# Patient Record
Sex: MALE | Race: WHITE
[De-identification: names, ages, dates, MRNs, and addresses within clinical notes are randomized per-mention and may not be internally consistent; named-entity substitution may affect disease eponyms.]

---

## 2017-04-02 ENCOUNTER — HOSPITAL ENCOUNTER (EMERGENCY)
Dept: HOSPITAL 52 - LL.ED | Age: 72
Discharge: ANOTHER HOSPITAL | End: 2017-04-02
Payer: MEDICARE

## 2017-04-02 VITALS — SYSTOLIC BLOOD PRESSURE: 119 MMHG | DIASTOLIC BLOOD PRESSURE: 57 MMHG

## 2017-04-02 DIAGNOSIS — I48.91: ICD-10-CM

## 2017-04-02 DIAGNOSIS — Z87.891: ICD-10-CM

## 2017-04-02 DIAGNOSIS — E78.00: ICD-10-CM

## 2017-04-02 DIAGNOSIS — E66.9: ICD-10-CM

## 2017-04-02 DIAGNOSIS — I25.810: ICD-10-CM

## 2017-04-02 DIAGNOSIS — L03.116: Primary | ICD-10-CM

## 2017-04-02 DIAGNOSIS — Z79.4: ICD-10-CM

## 2017-04-02 DIAGNOSIS — E11.9: ICD-10-CM

## 2017-04-02 DIAGNOSIS — M19.90: ICD-10-CM

## 2017-04-02 DIAGNOSIS — I50.9: ICD-10-CM

## 2017-04-02 DIAGNOSIS — Z79.899: ICD-10-CM

## 2017-04-02 DIAGNOSIS — F32.9: ICD-10-CM

## 2017-04-02 DIAGNOSIS — J44.9: ICD-10-CM

## 2017-04-02 DIAGNOSIS — Z79.01: ICD-10-CM

## 2017-04-02 DIAGNOSIS — F41.9: ICD-10-CM

## 2017-04-02 LAB
CHLORIDE SERPL-SCNC: 104 MMOL/L (ref 98–107)
SODIUM SERPL-SCNC: 142 MMOL/L (ref 136–145)

## 2017-04-02 PROCEDURE — 36415 COLL VENOUS BLD VENIPUNCTURE: CPT

## 2017-04-02 PROCEDURE — 99285 EMERGENCY DEPT VISIT HI MDM: CPT

## 2017-04-02 PROCEDURE — 83605 ASSAY OF LACTIC ACID: CPT

## 2017-04-02 PROCEDURE — 96367 TX/PROPH/DG ADDL SEQ IV INF: CPT

## 2017-04-02 PROCEDURE — 85025 COMPLETE CBC W/AUTO DIFF WBC: CPT

## 2017-04-02 PROCEDURE — 87040 BLOOD CULTURE FOR BACTERIA: CPT

## 2017-04-02 PROCEDURE — 81001 URINALYSIS AUTO W/SCOPE: CPT

## 2017-04-02 PROCEDURE — 96365 THER/PROPH/DIAG IV INF INIT: CPT

## 2017-04-02 PROCEDURE — 96360 HYDRATION IV INFUSION INIT: CPT

## 2017-04-02 PROCEDURE — 80053 COMPREHEN METABOLIC PANEL: CPT

## 2017-04-02 PROCEDURE — 85610 PROTHROMBIN TIME: CPT

## 2017-04-02 NOTE — EDM.PDOC
ED HPI GENERAL MEDICAL PROBLEM





- General


Chief Complaint: General


Stated Complaint: FEVER, LEFT FOOT WOUND


Time Seen by Provider: 04/02/17 00:57


Source of Information: Reports: Patient, EMS, Other ([facility] staff)





- History of Present Illness


INITIAL COMMENTS - FREE TEXT/NARRATIVE: 





[facility] from [facility] for evaluation of fever and red/swollen 

left foot.  Per [facility], patient had developed large blister on bottom of left 

foot and [facility].  At that time the blister was 

opened and patient received IV antibiotics per report. Was not sent home on 

antibiotics. Staff noticed increasing redness and foot appeared more swollen 

this evening. Patient eventually developed fever of 102 degrees. 





Patient himself is without complaint. He denies any pain nor does he feel sick.

  


Treatments PTA: Reports: Acetaminophen





- Related Data


 Allergies











Allergy/AdvReac Type Severity Reaction Status Date / Time


 


No Known Allergies Allergy   Verified 04/02/17 00:40











Home Meds: 


 Home Meds





Acetaminophen 2 tab PO Q6H PRN 09/30/15 [History]


Acetaminophen/HYDROcodone [Norco 325-5 MG] 1 tab PO Q6H PRN 09/30/15 [History]


Bisacodyl [Dulcolax] 10 mg RECTAL DAILY PRN 09/30/15 [History]


Carboxymethylcellulose/Lytes [Walt-Stir Oral Cowdrey] 1 applic MUCMEM ASDIRECTED 09

/30/15 [History]


Cholecalciferol (Vitamin D3) [Vitamin D] 800 unit PO DAILY 09/30/15 [History]


Escitalopram [Lexapro] 5 mg PO DAILY 09/30/15 [History]


Gabapentin [Neurontin] 900 mg PO TID 09/30/15 [History]


Hydrocortisone [Anusol-HC] 30 gm RC BID PRN 09/30/15 [History]


Insulin Detemir [Levemir] 18 unit SQ DAILY 09/30/15 [History]


Ipratropium [Atrovent 0.06% Nasal Spray] 15 ml JOVITA BID 09/30/15 [History]


LORazepam 0.25 mg PO BID PRN 09/30/15 [History]


Magnesium Hydroxide [Milk of Magnesia] 30 ml PO BID 09/30/15 [History]


Methadone 10 mg PO BID 09/30/15 [History]


Methyl Salicylate/Menthol [Muscle Rub] 1 applic TP Q6H PRN 09/30/15 [History]


Potassium Chloride 40 meq PO TID 09/30/15 [History]


QUEtiapine Fumarate [Quetiapine Fumarate] 25 mg PO TID 09/30/15 [History]


Sennosides/Docusate Sodium [Dok Plus Tablet] 1 each PO BID 09/30/15 [History]


Simvastatin [Zocor] 10 mg PO DAILY 09/30/15 [History]


Sotalol [Betapace] 80 mg PO BID 09/30/15 [History]


Torsemide 20 mg PO BID 09/30/15 [History]


Triamcinolone Acetonide [Triamcinolone Acetonide 0.1% Crm] 15 gm TOP BID PRN 09/

30/15 [History]


Warfarin [Coumadin] 5 mg PO ASDIRECTED 09/30/15 [History]


Albuterol/Ipratropium [DuoNeb 3.0-0.5 MG/3 ML] 1 vial INH BID 10/01/15 [History]


LORazepam [Ativan] 0.5 mg PO TID 10/01/15 [History]


Sodium Chloride [Saline Nasal Spray] 1 spray JOVITA ASDIRECTED PRN 10/01/15 [

History]


Theophylline [Theophylline Anhydrous] 300 mg PO BID 10/01/15 [History]


Enoxaparin [Lovenox] 150 mg SQ BID 04/02/17 [History]











Past Medical History


HEENT History: Reports: Cataract, Glaucoma, Hard of hearing


Cardiovascular History: Reports: Afib, Blood clots/VTE/DVT (PE), CAD, Heart 

Failure, High cholesterol, PVD


Respiratory History: Reports: COPD, Sleep apnea


Other Respiratory History: Diagnosis of a PE


Gastrointestinal History: Reports: Chronic constipation


Genitourinary History: Reports: BPH, Chronic renal insuffiency, Urinary 

incontinence


Other Genitourinary History: roth in place


Musculoskeletal History: Reports: Back pain, chronic, Osteoarthritis


Other Musculoskeletal History: spinal stenosis, generalized muscle weakness, 

chronic pain syndrome


Psychiatric History: Reports: Anxiety, Depression


Endocrine/Metabolic History: Reports: Diabetes, type II, Obesity/BMI 30+


Dermatologic History: Reports: Cellulitis





- Past Surgical History


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





Social & Family History





- Tobacco Use


Smoking Status *Q: Former Smoker


Years of Tobacco use: 20


Packs/Tins Daily: 2


Used Tobacco, but Quit: Yes


Month Tobacco Last Used: unsure


Second Hand Smoke Exposure: No





- Caffeine Use


Caffeine Use: Reports: None





- Recreational Drug Use


Recreational Drug Use: No





ED ROS GENERAL





- Review of Systems


Review Of Systems: ROS reveals no pertinent complaints other than HPI. (Patient 

has no complaints. Knows he is at hospital. Says he feels fine.)





ED EXAM, GENERAL





- Physical Exam


Exam: See Below


Exam Limited By: No limitations


General Appearance: other (sleeping but wakes up to answer questions. Tries to 

cooperate with exam. )


Eye Exam: bilateral eye: EOMI


Ears: normal external exam


Nose: No: no blood, nasal swelling, nasal drainage


Throat/Mouth: Normal lips, Normal voice, No airway compromise


Head: atraumatic, normocephalic


Neck: supple, non-tender, full range of motion


Respiratory/Chest: no respiratory distress, lungs clear, normal breath sounds, 

no accessory muscle use, chest non-tender


Cardiovascular: regular rate, rhythm, no murmur


Peripheral Pulses: 1+: dorsalis pedis (L), dorsalis pedis (R)


GI/Abdominal: soft, non tender


 (Male) Exam: Deferred


Rectal (Males) Exam: Deferred


Extremities: pedal edema, other (dusky color to legs below knees. Edema of 

ankles and feet bilaterally. Left foot reddened. Large blister noted involving 

much of sole of left foot. Opened on one edge. No active drainage. ).  No: Lesley

's Sign, leg pain


Neurological: alert


Psychiatric: other (appropriate. Prefers to be left alone to sleep. )


Skin Exam: Warm, Dry, Other (see above for blister/redness left foot)





Course





- Vital Signs


Last Recorded V/S: 





 Last Vital Signs











Temp  38.6 C H  04/02/17 00:30


 


Pulse  72   04/02/17 01:30


 


Resp  22 H  04/02/17 01:00


 


BP  119/57 L  04/02/17 01:30


 


Pulse Ox  96   04/02/17 01:30














- Orders/Labs/Meds


Orders: 





 Active Orders 24 hr











 Category Date Time Status


 


 CULTURE BLOOD [BC] Stat Lab  04/02/17 00:59 Ordered


 


 CULTURE BLOOD [BC] Stat Lab  04/02/17 00:59 Ordered


 


 UA W/MICROSCOPIC [URIN] Stat Lab  04/02/17 00:59 Uncollected


 


 Piperacillin/Tazobactam [Zosyn] 3.375 gm Med  04/02/17 01:30 Ordered





 Sodium Chloride 0.9% [Normal Saline] 100 ml   





 IV Q6H   


 


 Vancomycin 2,000 mg Med  04/02/17 01:11 Ordered





 Sodium Chloride 0.9% [Normal Saline] 100 ml   





 IV ONETIME   


 


 Blood Culture x2 Reflex Set [OM.PC] Stat Oth  04/02/17 00:59 Ordered








 Medication Orders





Vancomycin HCl 2,000 mg/ (Sodium Chloride)  100 mls @ 110 mls/hr IV ONETIME ONE


   Stop: 04/02/17 02:05


Piperacillin Sod/Tazobactam (Sod 3.375 gm/ Sodium Chloride)  100 mls @ 200 mls/

hr IV Q6H ONE


   Stop: 04/02/17 01:59








Labs: 





 Laboratory Tests











  04/02/17 04/02/17 04/02/17 Range/Units





  01:00 01:00 01:00 


 


WBC  12.6 H    (4.0-10.2)  K/uL


 


RBC  3.79 L    (4.33-5.41)  M/uL


 


Hgb  10.3 L    (13.1-16.8)  g/dL


 


Hct  36.2 L    (39.0-49.0)  %


 


MCV  95.5    (84.0-98.0)  fL


 


MCH  27.2 L    (28.2-33.3)  pg


 


MCHC  28.5 L    (31.7-36.0)  g/dL


 


RDW  14.4 H    (11.2-14.1)  %


 


Plt Count  286    (150-350)  K/uL


 


Neut % (Auto)  80.3 H    (45.0-80.0)  %


 


Lymph % (Auto)  11.7    (10.0-50.0)  %


 


Mono % (Auto)  6.4    (2.0-14.0)  %


 


Eos % (Auto)  1.5    (0.0-5.0)  %


 


Baso % (Auto)  0.1    (0.0-2.0)  %


 


Neut # (Auto)  10.11 H    (1.40-7.00)  K/uL


 


Lymph # (Auto)  1.48    (0.50-3.50)  K/uL


 


Mono # (Auto)  0.81    (0.00-1.00)  K/uL


 


Eos # (Auto)  0.19    (0.00-0.50)  K/uL


 


Baso # (Auto)  0.01    (0.00-0.20)  K/uL


 


PT   17.4 H   (9.8-11.7)  SEC


 


INR   1.6   


 


Sodium    142  (136-145)  mmol/L


 


Potassium    5.2 H  (3.5-5.1)  mmol/L


 


Chloride    104  ()  mmol/L


 


Carbon Dioxide    32.2 H  (21.0-32.0)  mmol/L


 


BUN    27 H  (7-18)  mg/dL


 


Creatinine    0.95  (0.51-1.17)  mg/dL


 


Est Cr Clr Drug Dosing    88.58  mL/min


 


Estimated GFR (MDRD)    > 60  mL/min


 


Glucose    207 H  ()  mg/dL


 


Lactic Acid     (0.4-2.0)  mmol/L


 


Calcium    9.0  (8.5-10.1)  mg/dL


 


Total Bilirubin    0.2  (0.2-1.0)  mg/dL


 


AST    20  (15-37)  U/L


 


ALT    16  (12-78)  U/L


 


Alkaline Phosphatase    108  ()  IU/L


 


Total Protein    7.3  (6.4-8.2)  g/dL


 


Albumin    2.5 L  (3.4-5.0)  g/dL














  04/02/17 Range/Units





  01:00 


 


WBC   (4.0-10.2)  K/uL


 


RBC   (4.33-5.41)  M/uL


 


Hgb   (13.1-16.8)  g/dL


 


Hct   (39.0-49.0)  %


 


MCV   (84.0-98.0)  fL


 


MCH   (28.2-33.3)  pg


 


MCHC   (31.7-36.0)  g/dL


 


RDW   (11.2-14.1)  %


 


Plt Count   (150-350)  K/uL


 


Neut % (Auto)   (45.0-80.0)  %


 


Lymph % (Auto)   (10.0-50.0)  %


 


Mono % (Auto)   (2.0-14.0)  %


 


Eos % (Auto)   (0.0-5.0)  %


 


Baso % (Auto)   (0.0-2.0)  %


 


Neut # (Auto)   (1.40-7.00)  K/uL


 


Lymph # (Auto)   (0.50-3.50)  K/uL


 


Mono # (Auto)   (0.00-1.00)  K/uL


 


Eos # (Auto)   (0.00-0.50)  K/uL


 


Baso # (Auto)   (0.00-0.20)  K/uL


 


PT   (9.8-11.7)  SEC


 


INR   


 


Sodium   (136-145)  mmol/L


 


Potassium   (3.5-5.1)  mmol/L


 


Chloride   ()  mmol/L


 


Carbon Dioxide   (21.0-32.0)  mmol/L


 


BUN   (7-18)  mg/dL


 


Creatinine   (0.51-1.17)  mg/dL


 


Est Cr Clr Drug Dosing   mL/min


 


Estimated GFR (MDRD)   mL/min


 


Glucose   ()  mg/dL


 


Lactic Acid  1.8  (0.4-2.0)  mmol/L


 


Calcium   (8.5-10.1)  mg/dL


 


Total Bilirubin   (0.2-1.0)  mg/dL


 


AST   (15-37)  U/L


 


ALT   (12-78)  U/L


 


Alkaline Phosphatase   ()  IU/L


 


Total Protein   (6.4-8.2)  g/dL


 


Albumin   (3.4-5.0)  g/dL











Meds: 





Medications











Generic Name Dose Route Start Last Admin





  Trade Name Freq  PRN Reason Stop Dose Admin


 


Vancomycin HCl 2,000 mg/  100 mls @ 110 mls/hr  04/02/17 01:11  





  Sodium Chloride  IV  04/02/17 02:05  





  ONETIME ONE   


 


Piperacillin Sod/Tazobactam  100 mls @ 200 mls/hr  04/02/17 01:30  





  Sod 3.375 gm/ Sodium Chloride  IV  04/02/17 01:59  





  Q6H ONE   














- Re-Assessments/Exams


Free Text/Narrative Re-Assessment/Exam: 








Assessment: Cellulitis of left foot.





WBC 12.6


Hgb 10.3


INR 1.6


K 5.2


Cr 0.95


Glu 207





[facility]  [facility]. She requested 

that we give Zosyn as well as Vanco prior to transfer. 


Small amount of NS also given. 


Patient rested comfortably. Vital signs stable. 


Potassium just above normal limits. No further intervention other than IV fluid 

prior to transfer. 





Departure





- Departure


Time of Disposition: 02:15


Disposition: DC/[facility] to [facility]


Condition: good


Clinical Impression: 


 Cellulitis of foot, left





Forms:  ED Department Discharge





- My Orders


Last 24 Hours: 





My Active Orders





04/02/17 00:59


CULTURE BLOOD [BC] Stat 


CULTURE BLOOD [BC] Stat 


UA W/MICROSCOPIC [URIN] Stat 


Blood Culture x2 Reflex Set [OM.PC] Stat 





04/02/17 01:11


Vancomycin 2,000 mg   Sodium Chloride 0.9% [Normal Saline] 100 ml IV ONETIME 





04/02/17 01:30


Piperacillin/Tazobactam [Zosyn] 3.375 gm   Sodium Chloride 0.9% [Normal Saline] 

100 ml IV Q6H 














- Assessment/Plan


Last 24 Hours: 





My Active Orders





04/02/17 00:59


CULTURE BLOOD [BC] Stat 


CULTURE BLOOD [BC] Stat 


UA W/MICROSCOPIC [URIN] Stat 


Blood Culture x2 Reflex Set [OM.PC] Stat 





04/02/17 01:11


Vancomycin 2,000 mg   Sodium Chloride 0.9% [Normal Saline] 100 ml IV ONETIME 





04/02/17 01:30


Piperacillin/Tazobactam [Zosyn] 3.375 gm   Sodium Chloride 0.9% [Normal Saline] 

100 ml IV Q6H

## 2017-05-08 ENCOUNTER — HOSPITAL ENCOUNTER (INPATIENT)
Dept: HOSPITAL 52 - LL.ED | Age: 72
LOS: 4 days | Discharge: NURSING, REHAB OR LONG-TERM CARE | DRG: 190 | End: 2017-05-12
Attending: FAMILY MEDICINE | Admitting: FAMILY MEDICINE
Payer: MEDICARE

## 2017-05-08 DIAGNOSIS — I50.9: ICD-10-CM

## 2017-05-08 DIAGNOSIS — I25.10: ICD-10-CM

## 2017-05-08 DIAGNOSIS — E11.9: ICD-10-CM

## 2017-05-08 DIAGNOSIS — J18.9: ICD-10-CM

## 2017-05-08 DIAGNOSIS — J44.0: Primary | ICD-10-CM

## 2017-05-08 DIAGNOSIS — M15.0: ICD-10-CM

## 2017-05-08 DIAGNOSIS — D64.9: ICD-10-CM

## 2017-05-08 DIAGNOSIS — Z79.4: ICD-10-CM

## 2017-05-08 DIAGNOSIS — E11.621: ICD-10-CM

## 2017-05-08 DIAGNOSIS — L97.409: ICD-10-CM

## 2017-05-08 DIAGNOSIS — R13.10: ICD-10-CM

## 2017-05-08 DIAGNOSIS — I26.99: ICD-10-CM

## 2017-05-08 DIAGNOSIS — N28.9: ICD-10-CM

## 2017-05-08 DIAGNOSIS — Z95.5: ICD-10-CM

## 2017-05-08 DIAGNOSIS — Z87.891: ICD-10-CM

## 2017-05-08 DIAGNOSIS — Z79.01: ICD-10-CM

## 2017-05-08 DIAGNOSIS — R79.89: ICD-10-CM

## 2017-05-08 DIAGNOSIS — I11.0: ICD-10-CM

## 2017-05-08 DIAGNOSIS — L03.116: ICD-10-CM

## 2017-05-08 DIAGNOSIS — I48.91: ICD-10-CM

## 2017-05-08 DIAGNOSIS — E83.42: ICD-10-CM

## 2017-05-08 DIAGNOSIS — E88.09: ICD-10-CM

## 2017-05-08 LAB
CHLORIDE SERPL-SCNC: 102 MMOL/L (ref 98–107)
SODIUM SERPL-SCNC: 140 MMOL/L (ref 136–145)

## 2017-05-08 PROCEDURE — 82550 ASSAY OF CK (CPK): CPT

## 2017-05-08 PROCEDURE — 82553 CREATINE MB FRACTION: CPT

## 2017-05-08 PROCEDURE — 85610 PROTHROMBIN TIME: CPT

## 2017-05-08 PROCEDURE — 84550 ASSAY OF BLOOD/URIC ACID: CPT

## 2017-05-08 PROCEDURE — 84484 ASSAY OF TROPONIN QUANT: CPT

## 2017-05-08 PROCEDURE — 86318 IA INFECTIOUS AGENT ANTIBODY: CPT

## 2017-05-08 PROCEDURE — 36415 COLL VENOUS BLD VENIPUNCTURE: CPT

## 2017-05-08 PROCEDURE — 83735 ASSAY OF MAGNESIUM: CPT

## 2017-05-08 PROCEDURE — 85025 COMPLETE CBC W/AUTO DIFF WBC: CPT

## 2017-05-08 PROCEDURE — 93005 ELECTROCARDIOGRAM TRACING: CPT

## 2017-05-08 PROCEDURE — 96375 TX/PRO/DX INJ NEW DRUG ADDON: CPT

## 2017-05-08 PROCEDURE — 96365 THER/PROPH/DIAG IV INF INIT: CPT

## 2017-05-08 PROCEDURE — 83605 ASSAY OF LACTIC ACID: CPT

## 2017-05-08 PROCEDURE — 87804 INFLUENZA ASSAY W/OPTIC: CPT

## 2017-05-08 PROCEDURE — 99285 EMERGENCY DEPT VISIT HI MDM: CPT

## 2017-05-08 PROCEDURE — 85379 FIBRIN DEGRADATION QUANT: CPT

## 2017-05-08 PROCEDURE — 71275 CT ANGIOGRAPHY CHEST: CPT

## 2017-05-08 PROCEDURE — 85730 THROMBOPLASTIN TIME PARTIAL: CPT

## 2017-05-08 PROCEDURE — 80053 COMPREHEN METABOLIC PANEL: CPT

## 2017-05-08 PROCEDURE — 83880 ASSAY OF NATRIURETIC PEPTIDE: CPT

## 2017-05-08 PROCEDURE — 84443 ASSAY THYROID STIM HORMONE: CPT

## 2017-05-08 PROCEDURE — 71010: CPT

## 2017-05-08 PROCEDURE — 87040 BLOOD CULTURE FOR BACTERIA: CPT

## 2017-05-08 PROCEDURE — S0028 INJECTION, FAMOTIDINE, 20 MG: HCPCS

## 2017-05-08 RX ADMIN — BUDESONIDE SCH MG: 0.5 SUSPENSION RESPIRATORY (INHALATION) at 19:50

## 2017-05-08 RX ADMIN — Medication PRN ML: at 15:58

## 2017-05-08 RX ADMIN — HYDROCODONE BITATRATE AND ACETAMINOPHEN SCH TAB: 5; 325 TABLET ORAL at 19:50

## 2017-05-08 RX ADMIN — Medication PRN ML: at 22:12

## 2017-05-08 RX ADMIN — THEOPHYLLINE SCH MG: 300 TABLET, EXTENDED RELEASE ORAL at 19:51

## 2017-05-08 RX ADMIN — Medication PRN ML: at 15:55

## 2017-05-08 RX ADMIN — POTASSIUM CHLORIDE SCH MEQ: 1500 TABLET, EXTENDED RELEASE ORAL at 18:23

## 2017-05-08 RX ADMIN — Medication PRN ML: at 10:35

## 2017-05-08 RX ADMIN — GUAIFENESIN AND DEXTROMETHORPHAN HYDROBROMIDE SCH TAB: 600; 30 TABLET, EXTENDED RELEASE ORAL at 18:23

## 2017-05-08 NOTE — EDM.PDOC
ED HPI GENERAL MEDICAL PROBLEM





- General


Chief Complaint: Respiratory Problem


Stated Complaint: hypoxia, lethargy


Time Seen by Provider: 05/08/17 09:50


Source of Information: Reports: Patient, Nursing home records, Old records ([facility] EMR.  No paper hospital chart available.)


History Limitations: Reports: Altered mental status (Some confusion with 

patient a somewhat poor historian)





- History of Present Illness


INITIAL COMMENTS - FREE TEXT/NARRATIVE: 





The patient was brought to the emergency room via transport vehicle from [facility] for evaluation of progressive confusion and 

hypoxia with symptoms starting at about 04:45 hours this morning.  Patient had 

a temperature of 101 in their facility and was started on O2 therapy and 

transferred to this facility with 6 L per minute by mask.  He apparently had an 

O2 sat as low as 75% on room air earlier this morning.  He was recently 

discharged from the [facility] in [place] for pneumonia.  Apparent mostly clear 

productive cough by his history.  Patient did receive his morning medications, 

including insulin, etc.


Onset: gradual


Onset Date: 05/08/17


Onset Time: 04:45


Duration: Getting worse


Location: Reports: other (No pain)


Improves with: Reports: None


Worsens with: Reports: None


Context: Reports: Other (As above)


Associated Symptoms: Reports: confusion, cough, cough w sputum, fever/chills, 

shortness of breath, weakness (Stable generalized).  Denies: chest pain, 

diaphoresis, malaise, nausea/vomiting, seizure


Treatments PTA: Reports: Oxygen





- Related Data


 Allergies











Allergy/AdvReac Type Severity Reaction Status Date / Time


 


bee venom protein (honey bee) Allergy  Cannot Verified 05/08/17 09:55





   Remember  











Home Meds: 


 Home Meds





Acetaminophen 2 tab PO Q6H PRN 09/30/15 [History]


Acetaminophen/HYDROcodone [Norco 325-5 MG] 2 tab PO 08,19 PRN 09/30/15 [History]


Bisacodyl [Dulcolax] 10 mg RECTAL DAILY PRN 09/30/15 [History]


Carboxymethylcellulose/Lytes [Walt-Stir Oral [place]] 1 applic MUCMEM BID PRN 09/30

/15 [History]


Cholecalciferol (Vitamin D3) [Vitamin D] 800 unit PO DAILY@10 09/30/15 [History]


Escitalopram [Lexapro] 10 mg PO DAILY@08 09/30/15 [History]


Gabapentin [Neurontin] 900 mg PO 10,15,19 09/30/15 [History]


Hydrocortisone [Anusol-HC] 30 gm RC BID PRN 09/30/15 [History]


Insulin Detemir [Levemir] 18 unit SQ DAILY@10 09/30/15 [History]


Ipratropium [Atrovent 0.06% Nasal Spray] 1 inh JOVITA DAILY@10 09/30/15 [History]


LORazepam 0.5 mg PO 10,15,19 09/30/15 [History]


Magnesium Hydroxide [Milk of Magnesia] 30 ml PO DAILY PRN 09/30/15 [History]


Methadone 10 mg PO 10,19 09/30/15 [History]


Methyl Salicylate/Menthol [Muscle Rub] 1 applic TP BID PRN 09/30/15 [History]


Potassium Chloride 40 meq PO 10,15,19 09/30/15 [History]


QUEtiapine Fumarate [Quetiapine Fumarate] 25 mg PO 08,12,19 09/30/15 [History]


Sennosides/Docusate Sodium [Dok Plus Tablet] 1 each PO 10,19 09/30/15 [History]


Simvastatin [Zocor] 10 mg PO DAILY@19 09/30/15 [History]


Sotalol [Betapace] 40 mg PO 10,19 09/30/15 [History]


Torsemide 20 mg PO DAILY@10 09/30/15 [History]


Triamcinolone Acetonide [Triamcinolone Acetonide 0.1% Crm] 15 gm TOP BID PRN 09/

30/15 [History]


Warfarin [Coumadin] 5 mg PO SUTUTHSA@19 09/30/15 [History]


Albuterol/Ipratropium [DuoNeb 3.0-0.5 MG/3 ML] 1 vial INH 11,19 10/01/15 [

History]


LORazepam [Ativan] 0.25 mg PO BID PRN 10/01/15 [History]


Sodium Chloride [Saline Nasal Spray] 1 spray JOVITA ASDIRECTED PRN 10/01/15 [

History]


Theophylline [Theophylline Anhydrous] 300 mg PO 10,19 10/01/15 [History]


Fluticasone Propionate [Flovent  MCG] 2 puff INH BID 04/02/17 [History]


Latanoprost [Xalatan 0.005% Ophth Soln] 1 drop EYEBOTH DAILY@19 04/02/17 [

History]


Phenylephrine HCl [Sudogest PE] 10 mg PO Q4HR PRN 04/02/17 [History]


Polyethylene Glycol 3350 [Miralax] 17 gram PO DAILY@15 04/02/17 [History]


guaiFENesin/Dextromethorphan [Tussin Dm Syrup] 10 ml PO Q4HR PRN 04/02/17 [

History]


Acetaminophen/HYDROcodone [Norco 325-5 MG] 1 tab PO DAILY PRN 05/08/17 [History]


Albuterol/Ipratropium [DuoNeb 3.0-0.5 MG/3 ML] 3 ml NEB Q4HRRT PRN 05/08/17 [

History]


Warfarin [Coumadin] 2.5 mg PO MOWEFR@19 05/08/17 [History]











Past Medical History


HEENT History: Reports: Allergic rhinitis, Cataract, Glaucoma, Hard of hearing, 

Impaired vision, Macular degeneration, Other (see below)


Other HEENT History: Glasses, left-sided macular degeneration, bilateral 

presbycusis with no current therapy


Cardiovascular History: Reports: Afib, Arrhythmia, Blood clots/VTE/DVT, Bypass, 

CAD, Heart Failure, High cholesterol, Hypertension, PVD, Other (see below).  

Denies: Pacemaker


Other Cardiovascular History: Complete right bundle branch block/ bifascicular 

bundle-branch block, atrial fibrillation with current Coumadin therapy, 

peripheral vascular disease with recurrent diabetic foot ulcers, varicose veins


Respiratory History: Reports: Bronchitis, recurrent, COPD, Intubation, previous

, PE, Pneumonia, recurrent, Sleep apnea, Other (see below).  Denies: Asthma, 

Pneumothorax, TB


Other Respiratory History: Diagnosis of bilateral PE on 9/30/16 with current 

Coumadin therapy


Gastrointestinal History: Reports: Cholelithiasis, Chronic constipation, Fecal 

incontinence.  Denies: Bowel obstruction, Celiac disease, Gastritis, GERD, 

Hepatitis, Inflammatory bowel disease, Irritable bowel syndrome, Jaundice, 

Pancreatitis, PUD


Genitourinary History: Reports: BPH, Chronic renal insuffiency, Diabetic 

nephropathy, Urinary incontinence, UTI, recurrent, Other (see below)


Other Genitourinary History: Chronic condom catheter therapy


Musculoskeletal History: Reports: Arthritis, Back pain, chronic, Fracture, Neck 

pain, chronic, Osteoarthritis, Other (see below)


Other Musculoskeletal History: spinal stenosis in the lumbar region by CT scan, 

generalized muscle weakness and myalgias, chronic pain syndrome with chronic 

narcotic therapy, apparent previous left tibial fracture with surgery as below


Neurological History: Reports: Neuropathy, diabetic.  Denies: CVA, TIA


Psychiatric History: Reports: Addiction, Anxiety, Depression, Other (see below)


Other Psychiatric History: Chronic narcotic therapy


Endocrine/Metabolic History: Reports: Diabetes, type II, IDDM, Obesity/BMI 30+.

  Denies: Diabetes, type I, Hypothyroidism


Hematologic History: Reports: None.  Denies: Anemia


Immunologic History: Reports: None.  Denies: AIDS, HIV, SLE


Dermatologic History: Reports: Cellulitis, Decubitus ulcer, Venous stasis 

dermatitis, Other (see below)


Other Dermatologic History: Chronic decubiti and diabetic ulcers





- Past Surgical History


Head Surgeries/Procedures: Reports: None


HEENT Surgical History: Reports: Adenoidectomy, Cataract surgery, Oral surgery, 

Tonsillectomy, Other (see below).  Denies: Eye surgery, Laser surgery, LASIK, 

Naso-sinus surgery


Other HEENT Surgeries/Procedures: Multiple teeth extractions with complete 

upper dentures, bilateral cataract extractions


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


Respiratory Surgical History: Reports: Thoracotomy, Other (see below)


Other Respiratory Surgeries/Procedures: Medial sternotomy for CABG


GI Surgical History: Reports: Cholecystectomy, Other (see below).  Denies: 

Appendectomy


Other GI Surgeries/Procedures: Arthroscopic cholecystectomy


Male  Surgical History: Reports: Circumcision, Other (see below).  Denies: 

TURP-Transurethral resection of prostate, Vasectomy


Other Male  Surgeries/Procedures: Circumcision as an infant


Endocrine Surgical History: Reports: None


Neurological Surgical History: Reports: Lumbar spine, Spinal fusion


Musculoskeletal Surgical History: Reports: ORIF, Other (see below).  Denies: 

Joint replacement


Other Musculoskeletal Surgeries/Procedures:: Elías placement of left tibial 

fracture


Oncologic Surgical History: Reports: None


Dermatological Surgical History: Reports: None





- Past Imaging History


Past Imaging History: Reports: CAT scan (CT of the chest on 9/30/15 positive 

for bilateral PE, CT of the abdomen and pelvis with oral and IV contrast on 6/27 /14)





- History Comment


History Comment: Patient is a somewhat poor historian





Social & Family History





- Family History


Family Medical History: Unobtainable





- Tobacco Use


Smoking Status *Q: Former Smoker


Tobacco Use Within Last Twelve Months: No


Years of Tobacco use: 20


Packs/Tins Daily: 2


Used Tobacco, but Quit: Yes


Month Tobacco Last Used: unsure


Smoking Cessation Information Provided To Patient: No


Second Hand Smoke Exposure: No


Second Hand Smoke Education Provided: No





- Caffeine Use


Caffeine Use: Reports: None





- Alcohol Use


Alcohol Use History: No


Days Per Week of Alcohol Use: 0 (Previous moderate alcohol with patient denying 

previous abuse)


Alcohol Use in Last Twelve Months: No





- Recreational Drug Use


Recreational Drug Use: No


Drug Use in Last 12 Months: No


Recreational Drug Type: Reports: Other (see below)


Other Recreational Drug Type: chronic narcotic use


Recreational Drug Route: Reports: Oral





- Living Situation & Occupation


Living situation: Reports:  (1970, one child), extended care facility (

[facility] in [place], [facility])


Occupation: retired ( at hospital)





ED ROS GENERAL





- Review of Systems


Review Of Systems: See Below


Constitutional: Reports: fever, chills


HEENT: Reports: Glasses, Hearing loss (Stable chronic), Rhinitis.  Denies: Ear 

discharge, Ear pain, Eye pain, Throat pain, Throat swelling, Vertigo, Vision 

change


Respiratory: Reports: Shortness of Breath, Wheezing, Cough, Sputum.  Denies: 

Pleuritic Chest Pain, Hemoptysis


Cardiovascular: Reports: Dyspnea on exertion, Edema (Stable chronic).  Denies: 

Chest pain, Blood pressure problem, Claudication, Lightheadedness, Orthopnea, 

Palpitations, Syncope


Endocrine: Reports: fatigue


GI/Abdominal: Reports: Stool incontinence (Chronic).  Denies: Abdominal pain, 

Anorexia, Black stool, Bloody stool, Constipation, Diarrhea, Decreased appetite

, Difficulty swallowing, Hematochezia, Melena, Nausea, Vomiting


: Reports: incontinence


Musculoskeletal: Reports: no symptoms.  Denies: neck pain, shoulder pain, arm 

pain, back pain, leg pain


Skin: Reports: wound (Left diabetic decubitus foot ulcer).  Denies: diaphoresis


Neurological: Reports: Confusion, Difficulty Walking, Weakness (Stable 

generalized).  Denies: Dizziness


Psychiatric: Reports: Confusion.  Denies: Agitation, Anxiety, Depression


Hematologic/Lymphatic: Reports: no symptoms


Immunologic: Reports: no symptoms





ED EXAM, GENERAL





- Physical Exam


Exam: See Below


Exam Limited By: Altered mental status


General Appearance: alert, no apparent distress


Eye Exam: bilateral eye: EOMI, normal inspection (No nystagmus ), PERRL


Ears: normal external exam, normal canal, normal TMs, hearing loss (Stable 

presbycusis bilaterally).  No: hearing grossly normal


Nose: normal inspection, normal mucosa, no blood, clear rhinorrhea (Moderate 

bilateral)


Throat/Mouth: Normal inspection, Normal lips, Normal gums, Normal oropharynx, 

Normal voice, No airway compromise.  No: Normal teeth (Complete upper dentures 

with lower dentition is somewhat poor repair with multiple missing teeth), 

Dysphagia, Inflammation, Perioral cyanosis


Head: atraumatic, normocephalic.  No: facial swelling, facial tenderness, sinus 

tenderness


Neck: normal inspection, supple, non-tender, full range of motion, carotid 

bruit (Mild bilateral carotid bruits).  No: lymphadenopathy (L), 

lymphadenopathy (R), thyromegaly


Respiratory/Chest: no respiratory distress, no accessory muscle use, chest non-

tender, rales (Moderate bilateral basilar rales).  No: rhonchi, wheezing, 

pleural rub


Cardiovascular: regular rate, rhythm, no gallop, no JVD, no murmur, no rub.  No

: no edema (Dependent edema as below), gallop/S3, gallop/S4, friction rub


Peripheral Pulses: 2+: radial (L), radial (R)


GI/Abdominal: normal bowel sounds, soft, non tender, no organomegaly, no 

distention, no abnormal bruit, no mass, other (Obese).  No: guarding


 (Male) Exam: Deferred, Other (Rodriguez catheter with leg bag)


Rectal (Males) Exam: Deferred


Back Exam: normal inspection, full range of motion.  No: CVA tenderness (L), 

CVA tenderness (R), muscle spasm


Extremities: normal range of motion, non-tender, pedal edema (+1 to +2 

bilateral pedal/pretibial edema, dressing in place on left foot with evidence 

of one irregular 7 cm in diameter grade 2 proximal plantar diabetic ulcer 

including dry gangrene at the base with additional 4 cm in diameter similar 

type lesion in the distal aspect of the plantar region of the left foot with 

moderate surrounding +1 erythema, no lymphangitis, bilateral padded foot boots)

.  No: Lesley's Sign


Neurological: alert, CN II-XII intact, no motor/sensory deficits, confused (

Borderline)


Psychiatric: normal affect, normal mood


Skin Exam: Warm, Dry, Wound/incision (Dressing as above), Other (Moderate 

venous stasis of the lower extremities bilaterally, cellulitis and diabetic 

ulcers as above).  No: Diaphoretic, Lymphangitis


Lymphatic: no adenopathy





EKG INTERPRETATION


EKG Date: 05/08/17


Time: 10:02


Rhythm: NSR


Rate (beats/min): 84


Axis: LAD-left axis deviation (Extended left cardiac axis)


P-wave: present (Mild diffuse biphasic P waves with extreme poor R-wave 

progression in the anterior leads)


QRS: RBBB (QRS interval 0.15 seconds representing a complete right bundle 

branch block left bifascicular bundle-branch block with T-wave inversion in 

leads 3 and V1)


ST-T: normal


QT: normal


SC/PQ Interval: SC interval is 0.22 seconds representing a first degree AV block


Comparison: no change (Last EKG on 9/30/15)


EKG Interpretation Comments: 





1.  No acute ischemic changes


2.  First degree AV block with history of atrial fibrillation


3.  Complete right bundle branch block/bifascicular bundle-branch block





Course





- Vital Signs


Last Recorded V/S: 


 Last Vital Signs











Temp  36.6 C   05/08/17 13:35


 


Pulse  63   05/08/17 13:35


 


Resp  16   05/08/17 13:35


 


BP  131/58 L  05/08/17 13:35


 


Pulse Ox  98   05/08/17 13:35














 Vital Signs - 24 hr











  05/08/17 05/08/17 05/08/17





  09:49 09:55 09:57


 


Temperature [ 37.8 C  





Oral]   


 


Pulse, 88  83





Peripheral [   





Right Brachial]   


 


Respiratory 20  18





Rate   


 


Blood Pressure 117/51 L  114/92 H





[Right Upper   





Arm]   


 


O2 Sat by Pulse 94 L  91 L





Oximetry   


 


O2 Sat by Pulse  90 L 





Oximetry [   





Nasal Cannula]   














  05/08/17 05/08/17 05/08/17





  10:12 10:27 10:38


 


Temperature [   





Oral]   


 


Pulse, 89 89 





Peripheral [   





Right Brachial]   


 


Respiratory 20 20 





Rate   


 


Blood Pressure 128/76 141/63 H 





[Right Upper   





Arm]   


 


O2 Sat by Pulse 91 L 93 L 





Oximetry   


 


O2 Sat by Pulse   92 L





Oximetry [   





Nasal Cannula]   














  05/08/17 05/08/17 05/08/17





  10:57 11:27 12:28


 


Temperature [   





Oral]   


 


Pulse, 79 79 70





Peripheral [   





Right Brachial]   


 


Respiratory 20 20 17





Rate   


 


Blood Pressure 107/54 L 113/58 L 





[Right Upper   





Arm]   


 


O2 Sat by Pulse 92 L 96 96





Oximetry   


 


O2 Sat by Pulse   





Oximetry [   





Nasal Cannula]   














  05/08/17 05/08/17 05/08/17





  12:30 13:00 13:35


 


Temperature [ 36.6 C  36.6 C





Oral]   


 


Pulse, 70 68 63





Peripheral [   





Right Brachial]   


 


Respiratory 17 16 16





Rate   


 


Blood Pressure 109/59 L 129/56 L 131/58 L





[Right Upper   





Arm]   


 


O2 Sat by Pulse 96 97 98





Oximetry   


 


O2 Sat by Pulse   





Oximetry [   





Nasal Cannula]   














- Orders/Labs/Meds


Orders: 


 Active Orders 24 hr











 Category Date Time Status


 


 Cardiac Monitoring [RC] .AS DIRECTED Care  05/08/17 09:55 Active


 


 EKG Documentation Completion [RC] ASDIRECTED Care  05/08/17 09:55 Active


 


 Oxygen Therapy, ED [RC] CONTINUOUS Care  05/08/17 09:55 Active


 


 Peripheral IV Care [RC] .AS DIRECTED Care  05/08/17 09:55 Active


 


 Pulse Oximetry [RC] CONTINUOUS Care  05/08/17 09:55 Active


 


 Up With Assistance [RC] PFP Care  05/08/17 09:55 Active


 


 Vital Signs [RC] Q30M Care  05/08/17 09:55 Active


 


 Nothing per Oral Now Diet [DIET] Diet  05/08/17 Breakfast Active


 


 Chest 1V Frontal [CR] Stat Exams  05/08/17 09:55 Taken


 


 Chest PE [Ang Chest] [CT] Stat Exams  05/08/17 10:55 Taken


 


 CULTURE BLOOD [BC] Stat Lab  05/08/17 10:00 Received


 


 CULTURE BLOOD [BC] Stat Lab  05/08/17 10:10 Received


 


 Sodium Chloride 0.9% [Saline Flush] Med  05/08/17 09:55 Active





 10 ml FLUSH ASDIRECTED PRN   


 


 Blood Culture x2 Reflex Set [OM.PC] Urgent Oth  05/08/17 09:56 Ordered


 


 Obtain Past Medical Record [OM.PC] Urgent Oth  05/08/17 09:55 Active


 


 Peripheral IV Insertion Adult [OM.PC] Stat Oth  05/08/17 09:55 Ordered


 


 Resuscitation Status Stat Resus Stat  05/08/17 09:55 Ordered








 Medication Orders





Sodium Chloride (Saline Flush)  10 ml FLUSH ASDIRECTED PRN


   PRN Reason: Keep Vein Open


   Last Admin: 05/08/17 10:35  Dose: 10 ml








Labs: 


 Laboratory Tests











  05/08/17 05/08/17 05/08/17 Range/Units





  10:00 10:00 10:00 


 


WBC  16.4 H    (4.0-10.2)  K/uL


 


RBC  4.16 L    (4.33-5.41)  M/uL


 


Hgb  11.5 L    (13.1-16.8)  g/dL


 


Hct  39.6    (39.0-49.0)  %


 


MCV  95.2    (84.0-98.0)  fL


 


MCH  27.6 L    (28.2-33.3)  pg


 


MCHC  29.0 L    (31.7-36.0)  g/dL


 


RDW  15.5 H    (11.2-14.1)  %


 


Plt Count  271    (150-350)  K/uL


 


Neut % (Auto)  85.5 H    (45.0-80.0)  %


 


Lymph % (Auto)  6.7 L    (10.0-50.0)  %


 


Mono % (Auto)  6.8    (2.0-14.0)  %


 


Eos % (Auto)  0.9    (0.0-5.0)  %


 


Baso % (Auto)  0.1    (0.0-2.0)  %


 


Neut # (Auto)  13.99 H    (1.40-7.00)  K/uL


 


Lymph # (Auto)  1.09    (0.50-3.50)  K/uL


 


Mono # (Auto)  1.11 H    (0.00-1.00)  K/uL


 


Eos # (Auto)  0.15    (0.00-0.50)  K/uL


 


Baso # (Auto)  0.02    (0.00-0.20)  K/uL


 


PT   35.5 H   (9.8-11.7)  SEC


 


INR   3.1   


 


APTT   43.9 H D   (23.5-30.0)  SEC


 


D-Dimer, Quantitative    692 H  (0-400)  ng/mL


 


Sodium     (136-145)  mmol/L


 


Potassium     (3.5-5.1)  mmol/L


 


Chloride     ()  mmol/L


 


Carbon Dioxide     (21.0-32.0)  mmol/L


 


BUN     (7-18)  mg/dL


 


Creatinine     (0.51-1.17)  mg/dL


 


Est Cr Clr Drug Dosing     mL/min


 


Estimated GFR (MDRD)     mL/min


 


Glucose     ()  mg/dL


 


Lactic Acid     (0.4-2.0)  mmol/L


 


Uric Acid     (2.6-7.2)  mg/dL


 


Calcium     (8.5-10.1)  mg/dL


 


Magnesium     (1.8-2.4)  mg/dL


 


Total Bilirubin     (0.2-1.0)  mg/dL


 


AST     (15-37)  U/L


 


ALT     (12-78)  U/L


 


Alkaline Phosphatase     ()  IU/L


 


Creatine Kinase     ()  U/L


 


Creatine Kinase Index     (0.0-2.5)  %


 


CK-MB (CK-2)     (0.00-3.60)  ng/mL


 


Troponin I     (0.000-0.056)  ng/mL


 


Pro-B-Natriuretic Pept     (0-125)  pg/mL


 


Total Protein     (6.4-8.2)  g/dL


 


Albumin     (3.4-5.0)  g/dL


 


TSH, Ultra Sensitive     (0.358-3.740)  mIU/mL


 


H. pylori IgG Antibody     (NEGATIVE)  














  05/08/17 05/08/17 05/08/17 Range/Units





  10:00 10:00 10:00 


 


WBC     (4.0-10.2)  K/uL


 


RBC     (4.33-5.41)  M/uL


 


Hgb     (13.1-16.8)  g/dL


 


Hct     (39.0-49.0)  %


 


MCV     (84.0-98.0)  fL


 


MCH     (28.2-33.3)  pg


 


MCHC     (31.7-36.0)  g/dL


 


RDW     (11.2-14.1)  %


 


Plt Count     (150-350)  K/uL


 


Neut % (Auto)     (45.0-80.0)  %


 


Lymph % (Auto)     (10.0-50.0)  %


 


Mono % (Auto)     (2.0-14.0)  %


 


Eos % (Auto)     (0.0-5.0)  %


 


Baso % (Auto)     (0.0-2.0)  %


 


Neut # (Auto)     (1.40-7.00)  K/uL


 


Lymph # (Auto)     (0.50-3.50)  K/uL


 


Mono # (Auto)     (0.00-1.00)  K/uL


 


Eos # (Auto)     (0.00-0.50)  K/uL


 


Baso # (Auto)     (0.00-0.20)  K/uL


 


PT     (9.8-11.7)  SEC


 


INR     


 


APTT     (23.5-30.0)  SEC


 


D-Dimer, Quantitative     (0-400)  ng/mL


 


Sodium  140    (136-145)  mmol/L


 


Potassium  4.7    (3.5-5.1)  mmol/L


 


Chloride  102    ()  mmol/L


 


Carbon Dioxide  32.0    (21.0-32.0)  mmol/L


 


BUN  15    (7-18)  mg/dL


 


Creatinine  0.93    (0.51-1.17)  mg/dL


 


Est Cr Clr Drug Dosing  90.48    mL/min


 


Estimated GFR (MDRD)  > 60    mL/min


 


Glucose  180 H    ()  mg/dL


 


Lactic Acid   2.0   (0.4-2.0)  mmol/L


 


Uric Acid  5.7    (2.6-7.2)  mg/dL


 


Calcium  9.0    (8.5-10.1)  mg/dL


 


Magnesium  1.5 L    (1.8-2.4)  mg/dL


 


Total Bilirubin  0.4    (0.2-1.0)  mg/dL


 


AST  30    (15-37)  U/L


 


ALT  24    (12-78)  U/L


 


Alkaline Phosphatase  121 H    ()  IU/L


 


Creatine Kinase  38    ()  U/L


 


Creatine Kinase Index  1.8    (0.0-2.5)  %


 


CK-MB (CK-2)  0.70    (0.00-3.60)  ng/mL


 


Troponin I  0.011    (0.000-0.056)  ng/mL


 


Pro-B-Natriuretic Pept  1979 H    (0-125)  pg/mL


 


Total Protein  7.7    (6.4-8.2)  g/dL


 


Albumin  2.9 L    (3.4-5.0)  g/dL


 


TSH, Ultra Sensitive  1.706    (0.358-3.740)  mIU/mL


 


H. pylori IgG Antibody    Negative  (NEGATIVE)  











Meds: 


Medications











Generic Name Dose Route Start Last Admin





  Trade Name Freq  PRN Reason Stop Dose Admin


 


Sodium Chloride  10 ml  05/08/17 09:55  05/08/17 10:35





  Saline Flush  FLUSH   10 ml





  ASDIRECTED PRN   Administration





  Keep Vein Open   














Discontinued Medications














Generic Name Dose Route Start Last Admin





  Trade Name aHrrison  PRN Reason Stop Dose Admin


 


Famotidine  40 mg  05/08/17 09:55  05/08/17 10:31





  Pepcid  IVPUSH  05/08/17 09:56  40 mg





  ONETIME ONE   Administration


 


Ceftriaxone Sodium 1 gm/  100 mls @ 200 mls/hr  05/08/17 10:53  05/08/17 11:15





  Sodium Chloride  IV  05/08/17 11:22  200 mls/hr





  ONETIME ONE   Administration


 


Iopamidol  100 ml  05/08/17 11:13  05/08/17 12:17





  Isovue-370 (76%)  IVPUSH  05/08/17 11:14  100 ml





  ONETIME ONE   Administration














- Radiology Interpretation


Free Text/Narrative:: 





Cardiac monitor shows normal sinus rhythm with heart rate in the 60-80s with no 

ectopy or arrhythmia





Chest x-ray, portable, shows moderate cardiomegaly with probable pulmonary 

hypertension and/or mild centralized CHF.  Moderate COPD changes present with 

additional moderate bilateral lobe pulmonary infiltrates.  Note status post 

medial sternotomy





Telephone consultation at 13:12 hours with the radiology department at [facility] with preliminary verbal report of CTA of the chest with IV 

contrast using PE protocol.  Note mostly right lower lobe pulmonary infiltrates 

with additional new left lingular PE


CT Results Date: 05/08/17


CT Results Time: 13:12





Departure





- Departure


Time of Disposition: 13:50


Disposition: Admitted As Inpatient 65


Condition: fair


Clinical Impression: 


 Cellulitis of foot, left, PE, Pulmonary embolism, IDDM (insulin dependent 

diabetes mellitus), D-dimer, elevated, Hypomagnesemia, Hypoalbuminemia, Renal 

insufficiency





Pneumonia


Qualifiers:


 Pneumonia type: due to unspecified organism Laterality: bilateral Lung location

: lower lobe of lung Qualified Code(s): J18.9 - Pneumonia, unspecified organism





Anemia


Qualifiers:


 Anemia type: unspecified type Qualified Code(s): D64.9 - Anemia, unspecified





CHF (congestive heart failure)


Qualifiers:


 Congestive heart failure type: unspecified congestive heart failure type 

Congestive heart failure chronicity: acute on chronic Qualified Code(s): I50.9 

- Heart failure, unspecified





Hypertension


Qualifiers:


 Hypertension type: essential hypertension Qualified Code(s): I10 - Essential (

primary) hypertension





Osteoarthritis


Qualifiers:


 Osteoarthritis location: multiple joints Osteoarthritis type: primary 

Qualified Code(s): M15.0 - Primary generalized (osteo)arthritis





Coronary artery disease


Qualifiers:


 Coronary Disease-Associated Artery/Lesion type: bypass graft Native vs. 

transplanted heart: native heart Associated angina: without angina Qualified 

Code(s): I25.810 - Atherosclerosis of coronary artery bypass graft(s) without 

angina pectoris





COPD (chronic obstructive pulmonary disease)


Qualifiers:


 COPD type: COPD with acute lower respiratory infection Qualified Code(s): 

J44.0 - Chronic obstructive pulmonary disease with acute lower respiratory 

infection








- Problem List & Annotations


(1) Pneumonia


SNOMED Code(s): 385938932


   Code(s): J18.9 - PNEUMONIA, UNSPECIFIED ORGANISM   Status: Acute   Priority: 

High   Current Visit: Yes   Onset Date: ~05/08/17   Annotation/Comment:: Note 

[facility] [place] [facility].  

Telephone consultation with [facility] [place] with no beds available in 

their facility.  [facility] assumes care in the a.m.  Blood cultures x2 were collected 

in the emergency room.  Attempt to obtain sputum specimen for culture and 

sensitivity.  Influenza screen negative today.  IV Rocephin therapy initiated 

in the emergency room.  The patient was successfully tapered to O2 therapy at 5 

L per minute by nasal cannula prior to admission   


Qualifiers: 


   Pneumonia type: due to unspecified organism   Laterality: bilateral   Lung 

location: lower lobe of lung   Qualified Code(s): J18.9 - Pneumonia, 

unspecified organism   





(2) CHF (congestive heart failure)


SNOMED Code(s): 79426463


   Code(s): I50.9 - HEART FAILURE, UNSPECIFIED   Status: Acute   Priority: High

   Current Visit: Yes   Onset Date: 05/08/17   Annotation/Comment:: Initiate IV 

Lasix therapy.  Note that patient is no code with no further workup, including 

echocardiogram, etc..  No recent chest pain or anginal-type symptoms.  Initiate 

standard rule out MI orders, however.  Note status post CABG therapy   


Qualifiers: 


   Congestive heart failure type: unspecified congestive heart failure type   

Congestive heart failure chronicity: acute on chronic   Qualified Code(s): 

I50.9 - Heart failure, unspecified   





(3) PE, Pulmonary embolism


SNOMED Code(s): 65496639


   Code(s): I26.99 - OTHER PULMONARY EMBOLISM WITHOUT ACUTE COR PULMONALE   

Status: Acute   Priority: High   Current Visit: Yes   Onset Date: 09/30/15   

Annotation/Comment:: Note previous bilateral PEs on 9/30/15 with new left 

lingual PE today by CT scan as above.  Patient is already on Coumadin therapy 

with therapeutic INR.  No further additional therapy at this time   





(4) COPD (chronic obstructive pulmonary disease)


SNOMED Code(s): 00694544


   Code(s): J44.9 - CHRONIC OBSTRUCTIVE PULMONARY DISEASE, UNSPECIFIED   Status

: Acute   Priority: Medium   Current Visit: Yes   Annotation/Comment:: 

Theophylline level with next set of blood work.  Otherwise continue aggressive 

antibiotic and nebulizer therapy   


Qualifiers: 


   COPD type: COPD with acute lower respiratory infection   Qualified Code(s): 

J44.0 - Chronic obstructive pulmonary disease with acute lower respiratory 

infection   





(5) Cellulitis of foot, left


SNOMED Code(s): 570123090


   Code(s): L03.116 - CELLULITIS OF LEFT LOWER LIMB   Status: Chronic   Priority

: Medium   Current Visit: Yes   Annotation/Comment:: Chronic cellulitis and 

diabetic ulcers of the left foot.  No evidence of discharge.  Wound culture and 

MRSA as per standard protocol.  Physical therapy consultation for wound care  

Note initiation of IV Rocephin therapy as above   





(6) IDDM (insulin dependent diabetes mellitus)


SNOMED Code(s): 89936781


   Code(s): E11.9 - TYPE 2 DIABETES MELLITUS WITHOUT COMPLICATIONS; Z79.4 - 

LONG TERM (CURRENT) USE OF INSULIN   Status: Chronic   Priority: Medium   

Current Visit: Yes   Annotation/Comment:: Glycosylated hemoglobin in the a.m.  

Initiate sliding scale per discretion of Dr. Gann. Note history of 

diabetic neuropathy and nephropathy with additional recurrent diabetic ulcers. 

  





(7) Anemia


SNOMED Code(s): 767513944


   Code(s): D64.9 - ANEMIA, UNSPECIFIED   Status: Acute   Priority: Medium   

Current Visit: Yes   Onset Date: ~05/08/17   Annotation/Comment:: Note mild 

anemia today with current Coumadin therapy.  Observe for now.  Hemoccult during 

this hospitalization.  Further workup depending on his clinical course   


Qualifiers: 


   Anemia type: unspecified type   Qualified Code(s): D64.9 - Anemia, 

unspecified   





(8) D-dimer, elevated


SNOMED Code(s): 328161598


   Code(s): R79.89 - OTHER SPECIFIED ABNORMAL FINDINGS OF BLOOD CHEMISTRY   

Status: Acute   Priority: High   Current Visit: Yes   Onset Date: 05/08/17   

Annotation/Comment:: Additional new PE today as above   





(9) Hypomagnesemia


SNOMED Code(s): 441207254


   Code(s): E83.42 - HYPOMAGNESEMIA   Status: Acute   Priority: Medium   

Current Visit: Yes   Onset Date: 05/08/17   Annotation/Comment:: Initiate 

magnesium oxide therapy   





(10) Hypoalbuminemia


SNOMED Code(s): 663224607


   Code(s): E88.09 - OTH DISORDERS OF PLASMA-PROTEIN METABOLISM, NEC   Status: 

Acute   Priority: Medium   Current Visit: Yes   Onset Date: ~05/08/17   

Annotation/Comment:: Initiate high-protein Glucerna supplements with caution 

secondary to his renal insufficiency   





(11) Hypertension


SNOMED Code(s): 58725591


   Code(s): I10 - ESSENTIAL (PRIMARY) HYPERTENSION   Status: Chronic   Priority

: Medium   Current Visit: Yes   Annotation/Comment:: Continue to observe blood 

pressures closely in light of his Lasix therapy   


Qualifiers: 


   Hypertension type: essential hypertension   Qualified Code(s): I10 - 

Essential (primary) hypertension   





(12) Osteoarthritis


SNOMED Code(s): 803519836


   Code(s): M19.90 - UNSPECIFIED OSTEOARTHRITIS, UNSPECIFIED SITE   Status: 

Chronic   Priority: Medium   Current Visit: Yes   Annotation/Comment:: Stable 

by history.  Consider uric acid level with Lasix therapy   


Qualifiers: 


   Osteoarthritis location: multiple joints   Osteoarthritis type: primary   

Qualified Code(s): M15.0 - Primary generalized (osteo)arthritis   





(13) Coronary artery disease


SNOMED Code(s): 72742349


   Code(s): I25.10 - ATHSCL HEART DISEASE OF NATIVE CORONARY ARTERY W/O ANG 

PCTRS   Status: Chronic   Priority: High   Current Visit: Yes   Annotation/

Comment:: As above   


Qualifiers: 


   Coronary Disease-Associated Artery/Lesion type: bypass graft   Native vs. 

transplanted heart: native heart   Associated angina: without angina   

Qualified Code(s): I25.810 - Atherosclerosis of coronary artery bypass graft(s) 

without angina pectoris   





(14) Renal insufficiency


SNOMED Code(s): 013229934, 690467177


   Code(s): N28.9 - DISORDER OF KIDNEY AND URETER, UNSPECIFIED   Status: 

Chronic   Priority: Medium   Current Visit: Yes   Annotation/Comment:: As above

   





- Problem List Review


Problem List Initiated/Reviewed/Updated: Yes





- My Orders


Last 24 Hours: 


My Active Orders





05/08/17 09:55


Cardiac Monitoring [RC] .AS DIRECTED 


EKG Documentation Completion [RC] ASDIRECTED 


Oxygen Therapy, ED [RC] CONTINUOUS 


Peripheral IV Care [RC] .AS DIRECTED 


Pulse Oximetry [RC] CONTINUOUS 


Up With Assistance [RC] PFP 


Vital Signs [RC] Q30M 


Chest 1V Frontal [CR] Stat 


Sodium Chloride 0.9% [Saline Flush]   10 ml FLUSH ASDIRECTED PRN 


Obtain Past Medical Record [OM.PC] Urgent 


Peripheral IV Insertion Adult [OM.PC] Stat 


Resuscitation Status Stat 





05/08/17 09:56


Blood Culture x2 Reflex Set [OM.PC] Urgent 





05/08/17 10:00


CULTURE BLOOD [BC] Stat 





05/08/17 10:10


CULTURE BLOOD [BC] Stat 





05/08/17 10:55


Chest PE [Ang Chest] [CT] Stat 





05/08/17 Breakfast


Nothing per Oral Now Diet [DIET] 














- Assessment/Plan


Admission H&P: Please use this note as an admission H&P


Last 24 Hours: 


My Active Orders





05/08/17 09:55


Cardiac Monitoring [RC] .AS DIRECTED 


EKG Documentation Completion [RC] ASDIRECTED 


Oxygen Therapy, ED [RC] CONTINUOUS 


Peripheral IV Care [RC] .AS DIRECTED 


Pulse Oximetry [RC] CONTINUOUS 


Up With Assistance [RC] PFP 


Vital Signs [RC] Q30M 


Chest 1V Frontal [CR] Stat 


Sodium Chloride 0.9% [Saline Flush]   10 ml FLUSH ASDIRECTED PRN 


Obtain Past Medical Record [OM.PC] Urgent 


Peripheral IV Insertion Adult [OM.PC] Stat 


Resuscitation Status Stat 





05/08/17 09:56


Blood Culture x2 Reflex Set [OM.PC] Urgent 





05/08/17 10:00


CULTURE BLOOD [BC] Stat 





05/08/17 10:10


CULTURE BLOOD [BC] Stat 





05/08/17 10:55


Chest PE [Ang Chest] [CT] Stat 





05/08/17 Breakfast


Nothing per Oral Now Diet [DIET] 











Assessment:: 





As above


Plan: 





As above. Extensive precautions were given to the patient, who is in agreement 

with the treatment plan.  [facility] assumes care in the a.m. The patient will require 

about 3-4 days of inpatient/acute care secondary to multiple health problems as 

above.

## 2017-05-09 LAB
CHLORIDE SERPL-SCNC: 103 MMOL/L (ref 98–107)
SODIUM SERPL-SCNC: 143 MMOL/L (ref 136–145)

## 2017-05-09 RX ADMIN — Medication PRN ML: at 08:30

## 2017-05-09 RX ADMIN — BUDESONIDE SCH MG: 0.5 SUSPENSION RESPIRATORY (INHALATION) at 08:34

## 2017-05-09 RX ADMIN — Medication SCH ML: at 20:41

## 2017-05-09 RX ADMIN — HYDROCODONE BITATRATE AND ACETAMINOPHEN SCH TAB: 5; 325 TABLET ORAL at 08:33

## 2017-05-09 RX ADMIN — GUAIFENESIN AND DEXTROMETHORPHAN HYDROBROMIDE SCH TAB: 600; 30 TABLET, EXTENDED RELEASE ORAL at 17:41

## 2017-05-09 RX ADMIN — POTASSIUM CHLORIDE SCH MEQ: 1500 TABLET, EXTENDED RELEASE ORAL at 11:03

## 2017-05-09 RX ADMIN — BUDESONIDE SCH MG: 0.5 SUSPENSION RESPIRATORY (INHALATION) at 20:37

## 2017-05-09 RX ADMIN — INSULIN DETEMIR SCH UNITS: 100 INJECTION, SOLUTION SUBCUTANEOUS at 10:51

## 2017-05-09 RX ADMIN — POTASSIUM CHLORIDE SCH MEQ: 1500 TABLET, EXTENDED RELEASE ORAL at 08:31

## 2017-05-09 RX ADMIN — HYDROCODONE BITATRATE AND ACETAMINOPHEN SCH TAB: 5; 325 TABLET ORAL at 20:37

## 2017-05-09 RX ADMIN — GUAIFENESIN AND DEXTROMETHORPHAN HYDROBROMIDE SCH TAB: 600; 30 TABLET, EXTENDED RELEASE ORAL at 08:31

## 2017-05-09 RX ADMIN — THEOPHYLLINE SCH MG: 300 TABLET, EXTENDED RELEASE ORAL at 10:48

## 2017-05-09 RX ADMIN — THEOPHYLLINE SCH MG: 300 TABLET, EXTENDED RELEASE ORAL at 20:37

## 2017-05-09 RX ADMIN — Medication PRN ML: at 10:53

## 2017-05-09 RX ADMIN — BENZOCAINE AND MENTHOL SCH INH: 15; 3.6 LOZENGE ORAL at 11:06

## 2017-05-09 RX ADMIN — METRONIDAZOLE SCH MLS/HR: 500 SOLUTION INTRAVENOUS at 20:36

## 2017-05-09 RX ADMIN — POTASSIUM CHLORIDE SCH MEQ: 1500 TABLET, EXTENDED RELEASE ORAL at 17:40

## 2017-05-10 LAB
CHLORIDE SERPL-SCNC: 104 MMOL/L (ref 98–107)
SODIUM SERPL-SCNC: 143 MMOL/L (ref 136–145)

## 2017-05-10 PROCEDURE — 02HV33Z INSERTION OF INFUSION DEVICE INTO SUPERIOR VENA CAVA, PERCUTANEOUS APPROACH: ICD-10-PCS | Performed by: FAMILY MEDICINE

## 2017-05-10 RX ADMIN — POTASSIUM CHLORIDE SCH MEQ: 1500 TABLET, EXTENDED RELEASE ORAL at 07:30

## 2017-05-10 RX ADMIN — GUAIFENESIN AND DEXTROMETHORPHAN HYDROBROMIDE SCH TAB: 600; 30 TABLET, EXTENDED RELEASE ORAL at 17:10

## 2017-05-10 RX ADMIN — BENZOCAINE AND MENTHOL SCH INH: 15; 3.6 LOZENGE ORAL at 09:28

## 2017-05-10 RX ADMIN — THEOPHYLLINE SCH MG: 300 TABLET, EXTENDED RELEASE ORAL at 20:11

## 2017-05-10 RX ADMIN — Medication SCH ML: at 07:33

## 2017-05-10 RX ADMIN — METRONIDAZOLE SCH MLS/HR: 500 SOLUTION INTRAVENOUS at 11:08

## 2017-05-10 RX ADMIN — Medication SCH ML: at 20:03

## 2017-05-10 RX ADMIN — HYDROCODONE BITATRATE AND ACETAMINOPHEN SCH TAB: 5; 325 TABLET ORAL at 20:13

## 2017-05-10 RX ADMIN — Medication PRN ML: at 03:16

## 2017-05-10 RX ADMIN — BUDESONIDE SCH MG: 0.5 SUSPENSION RESPIRATORY (INHALATION) at 07:33

## 2017-05-10 RX ADMIN — POTASSIUM CHLORIDE SCH MEQ: 1500 TABLET, EXTENDED RELEASE ORAL at 11:18

## 2017-05-10 RX ADMIN — Medication PRN ML: at 20:07

## 2017-05-10 RX ADMIN — GUAIFENESIN AND DEXTROMETHORPHAN HYDROBROMIDE SCH TAB: 600; 30 TABLET, EXTENDED RELEASE ORAL at 07:31

## 2017-05-10 RX ADMIN — METRONIDAZOLE SCH MLS/HR: 500 SOLUTION INTRAVENOUS at 03:16

## 2017-05-10 RX ADMIN — POTASSIUM CHLORIDE SCH MEQ: 1500 TABLET, EXTENDED RELEASE ORAL at 17:10

## 2017-05-10 RX ADMIN — INSULIN DETEMIR SCH UNITS: 100 INJECTION, SOLUTION SUBCUTANEOUS at 09:26

## 2017-05-10 RX ADMIN — THEOPHYLLINE SCH MG: 300 TABLET, EXTENDED RELEASE ORAL at 09:31

## 2017-05-10 RX ADMIN — HYDROCODONE BITATRATE AND ACETAMINOPHEN SCH TAB: 5; 325 TABLET ORAL at 07:32

## 2017-05-10 RX ADMIN — BUDESONIDE SCH MG: 0.5 SUSPENSION RESPIRATORY (INHALATION) at 20:19

## 2017-05-10 RX ADMIN — METRONIDAZOLE SCH MLS/HR: 500 SOLUTION INTRAVENOUS at 19:58

## 2017-05-10 NOTE — PCM.PN
- General Info


Date of Service: 05/10/17


Functional Status: Reports: pain controlled





- Review of Systems


General: Reports: No Symptoms


HEENT: Reports: no symptoms


Pulmonary: Reports: cough (improving)


Cardiovascular: Reports: No Symptoms


Gastrointestinal: Reports: No symptoms


Genitourinary: Reports: no symptoms


Musculoskeletal: Reports: no symptoms


Skin: Reports: no symptoms, bruising


Neurological: Reports: No Symptoms, Pre-Existing Deficit


Psychiatric: Reports: no symptoms





- Patient Data


Vitals - most recent: 


 Last Vital Signs











Temp  98 F   05/10/17 12:00


 


Pulse  60   05/10/17 12:00


 


Resp  18   05/10/17 12:00


 


BP  115/60   05/10/17 12:00


 


Pulse Ox  94 L  05/10/17 12:00











Weight - most recent: 303 lb 15.997 oz


I&O - last 24 hours: 


 Intake & Output











 05/10/17 05/10/17 05/10/17





 06:59 14:59 22:59


 


Intake Total 950 680 


 


Output Total 300 400 


 


Balance 650 280 











Lab Results last 24 hrs: 


 Laboratory Results - last 24 hr











  05/09/17 05/09/17 05/10/17 Range/Units





  17:39 20:36 07:05 


 


WBC    7.6  (4.0-10.2)  K/uL


 


RBC    3.57 L  (4.33-5.41)  M/uL


 


Hgb    9.9 L  (13.1-16.8)  g/dL


 


Hct    34.3 L  (39.0-49.0)  %


 


MCV    96.1  (84.0-98.0)  fL


 


MCH    27.7 L  (28.2-33.3)  pg


 


MCHC    28.9 L  (31.7-36.0)  g/dL


 


RDW    15.3 H  (11.2-14.1)  %


 


Plt Count    221  (150-350)  K/uL


 


Neut % (Auto)    55.7  (45.0-80.0)  %


 


Lymph % (Auto)    29.3  (10.0-50.0)  %


 


Mono % (Auto)    8.6  (2.0-14.0)  %


 


Eos % (Auto)    6.3 H  (0.0-5.0)  %


 


Baso % (Auto)    0.1  (0.0-2.0)  %


 


Neut # (Auto)    4.23  (1.40-7.00)  K/uL


 


Lymph # (Auto)    2.23  (0.50-3.50)  K/uL


 


Mono # (Auto)    0.65  (0.00-1.00)  K/uL


 


Eos # (Auto)    0.48  (0.00-0.50)  K/uL


 


Baso # (Auto)    0.01  (0.00-0.20)  K/uL


 


PT     (9.8-11.7)  SEC


 


INR     


 


APTT     (23.5-30.0)  SEC


 


Sodium     (136-145)  mmol/L


 


Potassium     (3.5-5.1)  mmol/L


 


Chloride     ()  mmol/L


 


Carbon Dioxide     (21.0-32.0)  mmol/L


 


BUN     (7-18)  mg/dL


 


Creatinine     (0.51-1.17)  mg/dL


 


Est Cr Clr Drug Dosing     mL/min


 


Estimated GFR (MDRD)     mL/min


 


Glucose     ()  mg/dL


 


POC Glucose  139 H  154 H   ()  mg/dl


 


Calcium     (8.5-10.1)  mg/dL


 


Total Bilirubin     (0.2-1.0)  mg/dL


 


AST     (15-37)  U/L


 


ALT     (12-78)  U/L


 


Alkaline Phosphatase     ()  IU/L


 


C-Reactive Protein     (<=0.9)  mg/dL


 


Total Protein     (6.4-8.2)  g/dL


 


Albumin     (3.4-5.0)  g/dL














  05/10/17 05/10/17 05/10/17 Range/Units





  07:05 07:05 07:26 


 


WBC     (4.0-10.2)  K/uL


 


RBC     (4.33-5.41)  M/uL


 


Hgb     (13.1-16.8)  g/dL


 


Hct     (39.0-49.0)  %


 


MCV     (84.0-98.0)  fL


 


MCH     (28.2-33.3)  pg


 


MCHC     (31.7-36.0)  g/dL


 


RDW     (11.2-14.1)  %


 


Plt Count     (150-350)  K/uL


 


Neut % (Auto)     (45.0-80.0)  %


 


Lymph % (Auto)     (10.0-50.0)  %


 


Mono % (Auto)     (2.0-14.0)  %


 


Eos % (Auto)     (0.0-5.0)  %


 


Baso % (Auto)     (0.0-2.0)  %


 


Neut # (Auto)     (1.40-7.00)  K/uL


 


Lymph # (Auto)     (0.50-3.50)  K/uL


 


Mono # (Auto)     (0.00-1.00)  K/uL


 


Eos # (Auto)     (0.00-0.50)  K/uL


 


Baso # (Auto)     (0.00-0.20)  K/uL


 


PT  24.3 H D    (9.8-11.7)  SEC


 


INR  2.2    


 


APTT  41.3 H    (23.5-30.0)  SEC


 


Sodium   143   (136-145)  mmol/L


 


Potassium   4.0   (3.5-5.1)  mmol/L


 


Chloride   104   ()  mmol/L


 


Carbon Dioxide   33.8 H   (21.0-32.0)  mmol/L


 


BUN   17   (7-18)  mg/dL


 


Creatinine   0.91   (0.51-1.17)  mg/dL


 


Est Cr Clr Drug Dosing   92.87   mL/min


 


Estimated GFR (MDRD)   > 60   mL/min


 


Glucose   94   ()  mg/dL


 


POC Glucose    98  ()  mg/dl


 


Calcium   8.8   (8.5-10.1)  mg/dL


 


Total Bilirubin   0.2   (0.2-1.0)  mg/dL


 


AST   17   (15-37)  U/L


 


ALT   16   (12-78)  U/L


 


Alkaline Phosphatase   88   ()  IU/L


 


C-Reactive Protein   10.1 H   (<=0.9)  mg/dL


 


Total Protein   6.5   (6.4-8.2)  g/dL


 


Albumin   2.4 L   (3.4-5.0)  g/dL














  05/10/17 Range/Units





  11:31 


 


WBC   (4.0-10.2)  K/uL


 


RBC   (4.33-5.41)  M/uL


 


Hgb   (13.1-16.8)  g/dL


 


Hct   (39.0-49.0)  %


 


MCV   (84.0-98.0)  fL


 


MCH   (28.2-33.3)  pg


 


MCHC   (31.7-36.0)  g/dL


 


RDW   (11.2-14.1)  %


 


Plt Count   (150-350)  K/uL


 


Neut % (Auto)   (45.0-80.0)  %


 


Lymph % (Auto)   (10.0-50.0)  %


 


Mono % (Auto)   (2.0-14.0)  %


 


Eos % (Auto)   (0.0-5.0)  %


 


Baso % (Auto)   (0.0-2.0)  %


 


Neut # (Auto)   (1.40-7.00)  K/uL


 


Lymph # (Auto)   (0.50-3.50)  K/uL


 


Mono # (Auto)   (0.00-1.00)  K/uL


 


Eos # (Auto)   (0.00-0.50)  K/uL


 


Baso # (Auto)   (0.00-0.20)  K/uL


 


PT   (9.8-11.7)  SEC


 


INR   


 


APTT   (23.5-30.0)  SEC


 


Sodium   (136-145)  mmol/L


 


Potassium   (3.5-5.1)  mmol/L


 


Chloride   ()  mmol/L


 


Carbon Dioxide   (21.0-32.0)  mmol/L


 


BUN   (7-18)  mg/dL


 


Creatinine   (0.51-1.17)  mg/dL


 


Est Cr Clr Drug Dosing   mL/min


 


Estimated GFR (MDRD)   mL/min


 


Glucose   ()  mg/dL


 


POC Glucose  158 H  ()  mg/dl


 


Calcium   (8.5-10.1)  mg/dL


 


Total Bilirubin   (0.2-1.0)  mg/dL


 


AST   (15-37)  U/L


 


ALT   (12-78)  U/L


 


Alkaline Phosphatase   ()  IU/L


 


C-Reactive Protein   (<=0.9)  mg/dL


 


Total Protein   (6.4-8.2)  g/dL


 


Albumin   (3.4-5.0)  g/dL











Maximilian Results last 24 hrs: 


 Microbiology











 05/09/17 12:20 Gram Stain - Final





 Sputum - Expectorated Sputum Culture - Preliminary





    YEAST


 


 05/08/17 17:00 MRSA (PCR) - Final





 Nasal, Right 











Med Orders - Current: 


 Current Medications





Acetaminophen (Tylenol)  650 mg PO Q4H PRN


   PRN Reason: Fever


Hydrocodone Bitart/Acetaminophen (Norco 325-5 Mg)  1 tab PO DAILY PRN


   PRN Reason: Pain


Hydrocodone Bitart/Acetaminophen (Norco 325-5 Mg)  2 tab PO BID@0800,1900 [facility]


   Last Admin: 05/10/17 07:32 Dose:  2 tab


Albuterol (Proventil Neb Soln)  2.5 mg INH Q2H PRN


   PRN Reason: SHORTNESS OF BREATH


Albuterol/Ipratropium (Duoneb 3.0-0.5 Mg/3 Ml)  3 ml NEB Q4HRRT PRN


   PRN Reason: Dyspnea


Albuterol/Ipratropium (Duoneb 3.0-0.5 Mg/3 Ml)  3 ml NEB Q6HRRT [facility]


   Last Admin: 05/10/17 07:29 Dose:  3 ml


Bisacodyl (Dulcolax)  10 mg RECTAL DAILY PRN


   PRN Reason: Constipation


Budesonide (Pulmicort)  0.5 mg NEB BIDRT [facility]


   Last Admin: 05/10/17 07:33 Dose:  0.5 mg


Escitalopram Oxalate (Lexapro)  10 mg PO DAILY@0800 [facility]


   Last Admin: 05/10/17 07:30 Dose:  10 mg


Furosemide (Lasix)  40 mg IVPUSH DAILY [facility]


   Last Admin: 05/10/17 07:30 Dose:  40 mg


Gabapentin (Neurontin)  900 mg PO TID@1000,1500,1900 [facility]


   Last Admin: 05/10/17 09:28 Dose:  900 mg


Guaifenesin/Dextromethorphan (Mucinex Dm Er 600-30 Mg)  1 tab PO BID [facility]


   Last Admin: 05/10/17 07:31 Dose:  1 tab


Azithromycin 500 mg/ Sodium (Chloride)  250 mls @ 250 mls/hr IV Q24H [facility]


   Last Admin: 05/09/17 14:56 Dose:  250 mls/hr


Ceftriaxone Sodium 1 gm/ (Sodium Chloride)  100 mls @ 200 mls/hr IV Q12H [facility]


   Last Admin: 05/10/17 10:24 Dose:  200 mls/hr


Metronidazole 500 mg/ Premix  100 mls @ 100 mls/hr IV Q8H [facility]


   Last Admin: 05/10/17 11:08 Dose:  100 mls/hr


Insulin Aspart (Novolog)  0 unit SUBCUT ACBED [facility]


   PRN Reason: Protocol


   Last Admin: 05/10/17 11:34 Dose:  4 unit


Insulin Detemir (Levemir)  18 unit SUBCUT DAILY@1000 [facility]


   Last Admin: 05/10/17 09:26 Dose:  18 units


Latanoprost (Xalatan 0.005% Ophth Soln)  0 ml EYEBOTH DAILY@1900 [facility]


   Last Admin: 05/09/17 20:46 Dose:  1 drop


Lorazepam (Ativan)  0.25 mg PO BID PRN


   PRN Reason: Anxiety


Lorazepam (Ativan)  0.5 mg PO TID@1000,1500,1900 [facility]


   Last Admin: 05/10/17 09:31 Dose:  0.5 mg


Magnesium Hydroxide (Milk Of Magnesia)  30 ml PO DAILY PRN


   PRN Reason: Constipation


Magnesium Oxide (Magnesium Oxide)  400 mg PO DAILY [facility]


   Last Admin: 05/10/17 07:31 Dose:  400 mg


Methadone HCl (Methadone)  10 mg PO BID@1000,1900 [facility]


   Last Admin: 05/10/17 09:29 Dose:  10 mg


Methyl Salicylate (Icy Hot Cream)  1 gm TOP BID PRN


   PRN Reason: Pain


Ipratropium Nasal (Spary 0.06%)  1 inh JOVITA DAILY@10 [facility]


   Last Admin: 05/10/17 09:28 Dose:  1 inh


Phenylephrine Hcl [ (Sudogest Pe] 10 Mg))  10 mg PO Q4HR PRN


   PRN Reason: Congestion


Polyethylene Glycol (Miralax)  17 gm PO DAILY@1500 [facility]


   Last Admin: 05/09/17 14:55 Dose:  17 gm


Potassium Chloride (Klor-Con M20)  20 meq PO TID [facility]


Quetiapine Fumarate (Seroquel)  25 mg PO TID@0800,1200,1900 [facility]


   Last Admin: 05/10/17 11:18 Dose:  25 mg


Saliva Substitute (Walt-Stir Oral Spray)  0 ml MUCMEM BID PRN


   PRN Reason: Dryness


Senna/Docusate Sodium (Senna Plus)  2 tab PO BID@1000,1900 [facility]


   Last Admin: 05/10/17 09:30 Dose:  2 tab


Simvastatin (Zocor)  10 mg PO DAILY@1900 [facility]


   Last Admin: 05/09/17 20:38 Dose:  10 mg


Sodium Chloride (Saline Flush)  10 ml FLUSH ASDIRECTED PRN


   PRN Reason: Keep Vein Open


   Last Admin: 05/10/17 03:16 Dose:  10 ml


Sodium Chloride (Ocean Nasal Spray)  0 ml JOVITA ASDIRECTED PRN


   PRN Reason: Dryness


Sodium Chloride (Saline Flush)  10 ml FLUSH Q12HR [facility]


   Last Admin: 05/10/17 07:33 Dose:  10 ml


Sotalol HCl (Betapace)  40 mg PO BID@1000,1900 [facility]


   Last Admin: 05/10/17 09:35 Dose:  40 mg


Theophylline (Theophylline Anhydrous)  300 mg PO BID@1000,1900 [facility]


   Last Admin: 05/10/17 09:31 Dose:  300 mg


Triamcinolone Acetonide (Triamcinolone Acetonide 0.1% Crm)  0 gm TOP BID PRN


   PRN Reason: Itching





Discontinued Medications





Acetaminophen (Tylenol)  650 mg PO Q4H PRN


   PRN Reason: Pain (Mild 1-3)/fever


Albuterol/Ipratropium (Duoneb 3.0-0.5 Mg/3 Ml)  3 ml NEB Q6HRRT [facility]


Enoxaparin Sodium (Lovenox)  100 mg SUBCUT ONETIME ONE


   Stop: 05/10/17 12:08


   Last Admin: 05/10/17 12:39 Dose:  100 mg


Famotidine (Pepcid)  40 mg IVPUSH ONETIME ONE


   Stop: 05/08/17 09:56


   Last Admin: 05/08/17 10:31 Dose:  40 mg


Furosemide (Lasix)  40 mg IVPUSH Q8H [facility]


   Last Admin: 05/09/17 14:56 Dose:  40 mg


Ceftriaxone Sodium 1 gm/ (Sodium Chloride)  100 mls @ 200 mls/hr IV ONETIME ONE


   Stop: 05/08/17 11:22


   Last Admin: 05/08/17 11:15 Dose:  200 mls/hr


Iopamidol (Isovue-370 (76%))  100 ml IVPUSH ONETIME ONE


   Stop: 05/08/17 11:14


   Last Admin: 05/08/17 12:17 Dose:  100 ml


Potassium Chloride (Klor-Con M20)  60 meq PO TID [facility]


   Last Admin: 05/09/17 11:03 Dose:  60 meq


Potassium Chloride (Klor-Con M20)  40 meq PO TID [facility]


   Last Admin: 05/10/17 11:18 Dose:  40 meq


Sodium Chloride (Saline Flush)  10 ml FLUSH Q12HR PRN


   PRN Reason: Keep Vein Open


   Last Admin: 05/09/17 08:31 Dose:  10 ml


Sodium Chloride (Saline Flush)  10 ml FLUSH Q12H PRN


   PRN Reason: Keep Vein Open











- Exam


Quality Assessment: supplemental oxygen


General: alert, cooperative, no acute distress


HEENT: Mucous membr. moist/pink


Neck: trachea midline, no JVD


Lungs: Normal respiratory effort, Decreased breath sounds, Rhonchi


Cardiovascular: Irregular Rhythm


Abdomen: bowel sounds present, soft, no tenderness, no distension


 (Male) Exam: Deferred


Back Exam: normal inspection


Extremities: no calf tenderness, edema


Skin: warm, dry, intact, ecchymosis


Wound/Incisions: other (foot ulcer necrotic)


Neurological: no new focal deficit


Psy/Mental Status: alert, other (flat affect)





- Problem List & Annotations


(1) Anemia


SNOMED Code(s): 323710556


   Code(s): D64.9 - ANEMIA, UNSPECIFIED   Status: Acute   Priority: Medium   

Current Visit: Yes   Onset Date: ~05/08/17   


Qualifiers: 


   Anemia type: unspecified type   Qualified Code(s): D64.9 - Anemia, 

unspecified   


Annotation/Comment:: Note mild anemia today with current Coumadin therapy.  

Observe for now.  Hemoccult during this hospitalization.  Further workup 

depending on his clinical course   





(2) CHF (congestive heart failure)


SNOMED Code(s): 00994078


   Code(s): I50.9 - HEART FAILURE, UNSPECIFIED   Status: Acute   Priority: High

   Current Visit: Yes   Onset Date: 05/08/17   


Qualifiers: 


   Congestive heart failure type: unspecified congestive heart failure type   

Congestive heart failure chronicity: acute on chronic   Qualified Code(s): 

I50.9 - Heart failure, unspecified   


Annotation/Comment:: Initiate IV Lasix therapy.  Note that patient is no code 

with no further workup, including echocardiogram, etc..  No recent chest pain 

or anginal-type symptoms.  Initiate standard rule out MI orders, however.  Note 

status post CABG therapy   





(3) COPD (chronic obstructive pulmonary disease)


SNOMED Code(s): 55758868


   Code(s): J44.9 - CHRONIC OBSTRUCTIVE PULMONARY DISEASE, UNSPECIFIED   Status

: Acute   Priority: Medium   Current Visit: Yes   


Qualifiers: 


   COPD type: COPD with acute lower respiratory infection   Qualified Code(s): 

J44.0 - Chronic obstructive pulmonary disease with acute lower respiratory 

infection   


Annotation/Comment:: Theophylline level with next set of blood work.  Otherwise 

continue aggressive antibiotic and nebulizer therapy   





(4) PE, Pulmonary embolism


SNOMED Code(s): 02440098


   Code(s): I26.99 - OTHER PULMONARY EMBOLISM WITHOUT ACUTE COR PULMONALE   

Status: Acute   Priority: High   Current Visit: Yes   Onset Date: 09/30/15   

Annotation/Comment:: Note previous bilateral PEs on 9/30/15 with new left 

lingual PE today by CT scan as above.  Patient is already on Coumadin therapy 

with therapeutic INR.  No further additional therapy at this time   





(5) Pneumonia


SNOMED Code(s): 763735291


   Code(s): J18.9 - PNEUMONIA, UNSPECIFIED ORGANISM   Status: Acute   Priority: 

High   Current Visit: Yes   Onset Date: ~05/08/17   


Qualifiers: 


   Pneumonia type: due to unspecified organism   Laterality: bilateral   Lung 

location: lower lobe of lung   Qualified Code(s): J18.9 - Pneumonia, 

unspecified organism   


Annotation/Comment:: [facility] [place] [facility] 

[facility] unknown type.  [facility] 

[place] with no beds available in their facility.  [facility] assumes care in the a.m.  

Blood cultures x2 were collected in the emergency room.  Attempt to obtain 

sputum specimen for culture and sensitivity.  Influenza screen negative today.  

IV Rocephin therapy initiated in the emergency room.  The patient was 

successfully tapered to O2 therapy at 5 L per minute by nasal cannula prior to 

admission   





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


SNOMED Code(s): 923869556924658


   Code(s): E11.621 - TYPE 2 DIABETES MELLITUS WITH FOOT ULCER; L97.409 - NON-

PRS CHRONIC ULCER OF UNSP HEEL AND MIDFOOT W UNSP SEVERT   Status: Acute   

Priority: High   Current Visit: Yes   





- Problem List Review


Problem List Initiated/Reviewed/Updated: Yes





- My Orders


Last 24 Hours: 


My Active Orders





05/09/17 16:08


Consult to Speech Language Pathology [SLP Evaluation and Treatment] [CONS] 

Routine 





05/09/17 16:13


Consult to Physician [CONS] Routine 





05/09/17 16:18


Notify Provider Consults [RC] ASDIRECTED 





05/09/17 19:00


metroNIDAZOLE/Normal Saline [Flagyl 500 MG in  ML] 500 mg   Premix Bag 1 

bag IV Q8H 





05/09/17 20:00


Sodium Chloride 0.9% [Saline Flush]   10 ml FLUSH Q12HR 





05/10/17 07:05


MYCOPLASMA IGM RAPID [MREF] Routine 





05/10/17 08:00


Furosemide [Lasix]   40 mg IVPUSH DAILY 





05/10/17 12:07


Consult to PICC Team [CONS] Routine 





05/10/17 14:15


Central Line Insert Checklist [RC] ASDIRECTED 


Communication Order [RC] ASDIRECTED 


Central Venous Line Insertion [OM.PC] Routine 


May Use Line for Blood Draw [OM.PC] Routine 





05/10/17 14:16


OR PCXR-No Charge-PICC/Central [CR] Stat 





05/10/17 18:00


Potassium Chloride [Klor-Con M20]   20 meq PO TID 





05/10/17 20:00


Central Line Assessment [RC] Q12H 





05/10/17 Breakfast


American Diabetic Association Diet [DIET] 





05/11/17 05:11


CBC WITH AUTO DIFF [HEME] DAILY 


CMP [COMPREHENSIVE METABOLIC PN,CMP] [CHEM] DAILY 


CRP [C-REACTIVE PROTEIN] [CHEM] DAILY 


INR,PT,PROTHROMBIN TIME [COAG] DAILY 





05/11/17 09:00


Swallowing Function w Video [CR] Routine 





05/12/17 05:11


CBC WITH AUTO DIFF [HEME] DAILY 


CMP [COMPREHENSIVE METABOLIC PN,CMP] [CHEM] DAILY 


CRP [C-REACTIVE PROTEIN] [CHEM] DAILY 


INR,PT,PROTHROMBIN TIME [COAG] DAILY 














- Plan


Plan:: 





05/09/17


Jorge A Guillaume MD


Feels better today. He does not want to be transferred to [facility] today. 

He also has me talk to his wife and she agrees to him staying in [place] at 

hospital. Continue medical plan.





05/10/17


Jorge A Guillaume MD


Continues to feel better. He does not want transfer to [facility]. 

Neuropathic ulcer foot secondary to type 2 DM noted. Surgical consult for 

possible debridement. PICC discussed due to difficult IV access and need for 

prolonged antibiotics.

## 2017-05-11 LAB
CHLORIDE SERPL-SCNC: 104 MMOL/L (ref 98–107)
SODIUM SERPL-SCNC: 142 MMOL/L (ref 136–145)

## 2017-05-11 RX ADMIN — Medication PRN ML: at 10:32

## 2017-05-11 RX ADMIN — METRONIDAZOLE SCH MLS/HR: 500 SOLUTION INTRAVENOUS at 02:30

## 2017-05-11 RX ADMIN — THEOPHYLLINE SCH MG: 300 TABLET, EXTENDED RELEASE ORAL at 09:37

## 2017-05-11 RX ADMIN — BUDESONIDE SCH MG: 0.5 SUSPENSION RESPIRATORY (INHALATION) at 20:44

## 2017-05-11 RX ADMIN — POTASSIUM CHLORIDE SCH MEQ: 1500 TABLET, EXTENDED RELEASE ORAL at 12:32

## 2017-05-11 RX ADMIN — INSULIN DETEMIR SCH UNITS: 100 INJECTION, SOLUTION SUBCUTANEOUS at 09:38

## 2017-05-11 RX ADMIN — THEOPHYLLINE SCH MG: 300 TABLET, EXTENDED RELEASE ORAL at 20:47

## 2017-05-11 RX ADMIN — POTASSIUM CHLORIDE SCH MEQ: 1500 TABLET, EXTENDED RELEASE ORAL at 07:28

## 2017-05-11 RX ADMIN — BENZOCAINE AND MENTHOL SCH INH: 15; 3.6 LOZENGE ORAL at 09:40

## 2017-05-11 RX ADMIN — Medication PRN ML: at 11:15

## 2017-05-11 RX ADMIN — Medication SCH ML: at 07:29

## 2017-05-11 RX ADMIN — POTASSIUM CHLORIDE SCH MEQ: 1500 TABLET, EXTENDED RELEASE ORAL at 17:45

## 2017-05-11 RX ADMIN — HYDROCODONE BITATRATE AND ACETAMINOPHEN SCH TAB: 5; 325 TABLET ORAL at 20:45

## 2017-05-11 RX ADMIN — Medication SCH ML: at 20:48

## 2017-05-11 RX ADMIN — HYDROCODONE BITATRATE AND ACETAMINOPHEN SCH TAB: 5; 325 TABLET ORAL at 07:28

## 2017-05-11 RX ADMIN — GUAIFENESIN AND DEXTROMETHORPHAN HYDROBROMIDE SCH TAB: 600; 30 TABLET, EXTENDED RELEASE ORAL at 07:26

## 2017-05-11 RX ADMIN — BUDESONIDE SCH MG: 0.5 SUSPENSION RESPIRATORY (INHALATION) at 07:28

## 2017-05-11 RX ADMIN — METRONIDAZOLE SCH MLS/HR: 500 SOLUTION INTRAVENOUS at 11:15

## 2017-05-11 RX ADMIN — METRONIDAZOLE SCH MLS/HR: 500 SOLUTION INTRAVENOUS at 20:44

## 2017-05-11 RX ADMIN — GUAIFENESIN AND DEXTROMETHORPHAN HYDROBROMIDE SCH TAB: 600; 30 TABLET, EXTENDED RELEASE ORAL at 17:45

## 2017-05-11 RX ADMIN — Medication PRN ML: at 02:33

## 2017-05-12 VITALS — SYSTOLIC BLOOD PRESSURE: 130 MMHG | DIASTOLIC BLOOD PRESSURE: 60 MMHG

## 2017-05-12 LAB
CHLORIDE SERPL-SCNC: 104 MMOL/L (ref 98–107)
SODIUM SERPL-SCNC: 141 MMOL/L (ref 136–145)

## 2017-05-12 RX ADMIN — THEOPHYLLINE SCH MG: 300 TABLET, EXTENDED RELEASE ORAL at 09:15

## 2017-05-12 RX ADMIN — BUDESONIDE SCH MG: 0.5 SUSPENSION RESPIRATORY (INHALATION) at 07:29

## 2017-05-12 RX ADMIN — METRONIDAZOLE SCH MLS/HR: 500 SOLUTION INTRAVENOUS at 10:59

## 2017-05-12 RX ADMIN — INSULIN DETEMIR SCH UNITS: 100 INJECTION, SOLUTION SUBCUTANEOUS at 09:18

## 2017-05-12 RX ADMIN — Medication SCH ML: at 07:34

## 2017-05-12 RX ADMIN — HYDROCODONE BITATRATE AND ACETAMINOPHEN SCH TAB: 5; 325 TABLET ORAL at 07:30

## 2017-05-12 RX ADMIN — GUAIFENESIN AND DEXTROMETHORPHAN HYDROBROMIDE SCH TAB: 600; 30 TABLET, EXTENDED RELEASE ORAL at 07:29

## 2017-05-12 RX ADMIN — POTASSIUM CHLORIDE SCH MEQ: 1500 TABLET, EXTENDED RELEASE ORAL at 11:00

## 2017-05-12 RX ADMIN — BENZOCAINE AND MENTHOL SCH INH: 15; 3.6 LOZENGE ORAL at 09:17

## 2017-05-12 RX ADMIN — POTASSIUM CHLORIDE SCH MEQ: 1500 TABLET, EXTENDED RELEASE ORAL at 07:31

## 2017-05-12 RX ADMIN — METRONIDAZOLE SCH MLS/HR: 500 SOLUTION INTRAVENOUS at 02:24

## 2017-05-12 RX ADMIN — Medication PRN ML: at 11:02

## 2017-05-12 NOTE — PN
Date of Service:  05/11/2017

 

HISTORY OF PRESENT ILLNESS:  This patient is seen today in consultation at the

request of Dr. Gann.  This diabetic male is currently hospitalized with

symptoms of confusion and hypoxia.  He was diagnosed with pneumonia.  The

patient has known history of ulcerations on the bottom of his left foot, and

consultation regarding management of these is requested today.  On questioning,

the patient states he does not have any pain in the left foot, but also admits

that his feeling in that foot has decreased.

 

PAST MEDICAL HISTORY:  Multiple chronic diagnoses including congestive heart

failure, diabetes, renal insufficiency, coronary artery disease, and

hypertension.  He has also been diagnosed with a chronic cellulitis of the left

leg, but there is no evidence of recent discharge.

 

PHYSICAL EXAMINATION:

Examination of the left foot was carried out today.  The foot itself does not

appear hyperemic.  There is noted, on the bottom of the foot, an approximately 7

x 5 cm ulceration with dark eschar in the mid foot and then distally, a smaller

ulcer of approximately 2 cm in size is noted.  This ulcer is also covered with a

black heavy eschar.  Currently, these eschars are firm, there is no drainage

noted, no tenderness is identified, and I do not appreciate any crepitus in the

area.

 

IMPRESSION AND RECOMMENDATIONS:  With the history of chronic cellulitis of the

foot and these heavy eschars, I did recommend to the patient that debridement of

the eschars be performed to allow complete drainage of these areas and be sure

there was no hidden deeper infection.  The patient stated that he has seen

several Wound consults in the past for this and because they did not recommend

debridement of the eschars previously, he does not wish debridement performed

today.  According to the patient's wishes, no debridement of the eschar was

performed today.  Pending the patient's clinical course and the appearance of

these ulcers and certainly if there should be any evidence of drainage from

underneath or change in the eschar, then

debridement to be sure that all areas of infection could drain should be

considered.  I will follow up with this patient carson Ness MD

DD:  05/11/2017 13:53:40

DT:  05/11/2017 18:35:17

Job #:  [id number]

## 2017-05-12 NOTE — [id number]
**Discharge Summary





- Discharge Data


Discharge Date: 05/12/17


Discharge Disposition: DC/Tfer to [facility]


Condition: Fair





- Discharge Diagnosis/Problem(s)


(1) Anemia


SNOMED Code(s): 640653414


   ICD Code: D64.9 - ANEMIA, UNSPECIFIED   Status: Acute   Priority: Medium   

Current Visit: Yes   Onset Date: ~05/08/17   Problem Details: Note mild anemia 

today with current Coumadin therapy.  Observe for now.  Hemoccult during this 

hospitalization.  Further workup depending on his clinical course   


Qualifiers: 


   Anemia type: unspecified type   Qualified Code(s): D64.9 - Anemia, 

unspecified   





(2) CHF (congestive heart failure)


SNOMED Code(s): 07241877


   ICD Code: I50.9 - HEART FAILURE, UNSPECIFIED   Status: Acute   Priority: 

High   Current Visit: Yes   Onset Date: 05/08/17   Problem Details: Initiate IV 

Lasix therapy.  Note that patient is no code with no further workup, including 

echocardiogram, etc..  No recent chest pain or anginal-type symptoms.  Initiate 

standard rule out MI orders, however.  Note status post CABG therapy   


Qualifiers: 


   Congestive heart failure type: unspecified congestive heart failure type   

Congestive heart failure chronicity: acute on chronic   Qualified Code(s): 

I50.9 - Heart failure, unspecified   





(3) COPD (chronic obstructive pulmonary disease)


SNOMED Code(s): 83081953


   ICD Code: J44.9 - CHRONIC OBSTRUCTIVE PULMONARY DISEASE, UNSPECIFIED   Status

: Acute   Priority: Medium   Current Visit: Yes   Problem Details: Theophylline 

level with next set of blood work.  Otherwise continue aggressive antibiotic 

and nebulizer therapy   


Qualifiers: 


   COPD type: COPD with acute lower respiratory infection   Qualified Code(s): 

J44.0 - Chronic obstructive pulmonary disease with acute lower respiratory 

infection   





(4) PE, Pulmonary embolism


SNOMED Code(s): 34520250


   ICD Code: I26.99 - OTHER PULMONARY EMBOLISM WITHOUT ACUTE COR PULMONALE   

Status: Acute   Priority: High   Current Visit: Yes   Onset Date: 09/30/15   

Problem Details: Note previous bilateral PEs on 9/30/15 with new left lingual 

PE today by CT scan as above.  Patient is already on Coumadin therapy with 

therapeutic INR.  No further additional therapy at this time   





(5) Pneumonia


SNOMED Code(s): 486400601


   ICD Code: J18.9 - PNEUMONIA, UNSPECIFIED ORGANISM   Status: Acute   Priority

: High   Current Visit: Yes   Onset Date: ~05/08/17   Problem Details: [facility] 

[facility] [place] [facility] type.  

[facility] [place] with no beds available in 

their facility.  [facility] assumes care in the a.m.  Blood cultures x2 were collected 

in the emergency room.  Attempt to obtain sputum specimen for culture and 

sensitivity.  Influenza screen negative today.  IV Rocephin therapy initiated 

in the emergency room.  The patient was successfully tapered to O2 therapy at 5 

L per minute by nasal cannula prior to admission   


Qualifiers: 


   Pneumonia type: due to unspecified organism   Laterality: bilateral   Lung 

location: lower lobe of lung   Qualified Code(s): J18.9 - Pneumonia, 

unspecified organism   





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


SNOMED Code(s): 756259447623557


   ICD Code: E11.621 - TYPE 2 DIABETES MELLITUS WITH FOOT ULCER; L97.409 - NON-

PRS CHRONIC ULCER OF UNSP HEEL AND MIDFOOT W UNSP SEVERT   Status: Acute   

Priority: High   Current Visit: Yes   





- Patient Summary/Data


Consults: 


 Consultations





05/08/17 14:19


PT Evaluation and Treatment [CONS] Routine 





05/09/17 16:08


Consult to Speech Language Pathology [SLP Evaluation and Treatment] [CONS] 

Routine 





05/09/17 16:13


Consult to Physician [CONS] Routine 





05/10/17 12:07


Consult to PICC Team [CONS] Routine 














- Patient Instructions


Diet: Diabetic Diet


Activity: As Tolerated


Driving: Do Not Drive


Showering/Bathing: May Shower


Wound/Incision Care: Keep Operative Site/Wound Site Clean and Dry


Notify Provider of: Fever, Increased Pain, Swelling and Redness, Drainage, 

Nausea and/or Vomiting


Other/Special Instructions: O2 2-4 LPM per nasal cannula continuous. Routine 

cares for PICC per facility protocol. Daily wound care to foot. referral to [facility] 

wound clinic evaluate and treat neuropathic foot ulcer due to DM Type 2. 

Referral to PT-OT-SPL evaluate and treat. Labs next week PT/INR, CBC,CRP, CMP.





- Discharge Plan


Prescriptions/Med Rec: 


cefTAZidime [Fortaz] 1 gm IVPUSH Q12H 10 Days


Home Medications: 


 Home Meds





Acetaminophen 2 tab PO Q6H PRN 09/30/15 [History]


Acetaminophen/HYDROcodone [Norco 325-5 MG] 2 tab PO BID@0800,1900 09/30/15 [

History]


Bisacodyl [Dulcolax] 10 mg RECTAL DAILY PRN 09/30/15 [History]


Carboxymethylcellulose/Lytes [Walt-Stir Oral [place]] 1 applic MUCMEM BID PRN 09/30

/15 [History]


Cholecalciferol (Vitamin D3) [Vitamin D3] 800 unit PO DAILY@1000 09/30/15 [

History]


Escitalopram [Lexapro] 10 mg PO DAILY@0800 09/30/15 [History]


Gabapentin [Neurontin] 900 mg PO TID@1000,1500,1900 09/30/15 [History]


Hydrocortisone [Anusol-HC] 30 gm RC BID PRN 09/30/15 [History]


Insulin Detemir [Levemir] 18 unit SQ DAILY@1000 09/30/15 [History]


Ipratropium [Atrovent 0.06% Nasal [place]] 1 inh JOVITA DAILY@1000 09/30/15 [History]


LORazepam 0.5 mg PO TID@1000,1500,1900 09/30/15 [History]


Magnesium Hydroxide [Milk of Magnesia] 30 ml PO DAILY PRN 09/30/15 [History]


Methadone 10 mg PO BID@1000,1900 09/30/15 [History]


Methyl Salicylate/Menthol [Muscle Rub] 1 applic TP BID PRN 09/30/15 [History]


Potassium Chloride 40 meq PO TID@1000,1500,1900 09/30/15 [History]


QUEtiapine Fumarate [Quetiapine Fumarate] 25 mg PO TID@0800,1200,1900 09/30/15 [

History]


Sennosides/Docusate Sodium [DOK Plus] 2 each PO BID@1000,1900 09/30/15 [History]


Simvastatin [Zocor] 10 mg PO DAILY@1900 09/30/15 [History]


Sotalol [Betapace] 40 mg PO BID@1000,1900 09/30/15 [History]


Torsemide 20 mg PO DAILY@1000 09/30/15 [History]


Triamcinolone Acetonide [Triamcinolone Acetonide 0.1% Crm] 15 gm TOP BID PRN 09/

30/15 [History]


Warfarin [Coumadin] 5 mg PO SUTUTHSA@1900 09/30/15 [History]


Albuterol/Ipratropium [DuoNeb 3.0-0.5 MG/3 ML] 1 vial INH BID@1100,1900 10/01/

15 [History]


LORazepam [Ativan] 0.25 mg PO BID PRN 10/01/15 [History]


Sodium Chloride [Saline Nasal Spray] 1 spray JOVITA ASDIRECTED PRN 10/01/15 [

History]


Theophylline [Theophylline Anhydrous] 300 mg PO BID@1000,1900 10/01/15 [History]


Fluticasone Propionate [Flovent  MCG] 2 puff INH BID@1000,1900 04/02/17 [

History]


Latanoprost [Xalatan 0.005% Ophth Soln] 1 drop EYEBOTH DAILY@1900 04/02/17 [

History]


Phenylephrine HCl [Sudogest PE] 10 mg PO Q4HR PRN 04/02/17 [History]


Polyethylene Glycol 3350 [Miralax] 17 gram PO DAILY@1500 04/02/17 [History]


guaiFENesin/Dextromethorphan [Tussin Dm Syrup] 10 ml PO Q4HR PRN 04/02/17 [

History]


Acetaminophen/HYDROcodone [Norco 325-5 MG] 1 tab PO DAILY PRN 05/08/17 [History]


Albuterol/Ipratropium [DuoNeb 3.0-0.5 MG/3 ML] 3 ml NEB Q4HRRT PRN 05/08/17 [

History]


Warfarin [Coumadin] 2.5 mg PO MOWEFR@1900 05/08/17 [History]


Magnesium Oxide 400 mg PO DAILY  tablet 05/12/17 [Rx]


Potassium Chloride [Klor-Con M20] 20 meq PO TID  tab.er 05/12/17 [Rx]


cefTAZidime [Fortaz] 1 gm IVPUSH Q12H 10 Days 05/12/17 [Rx]








Referrals: 


Melina Gann MD [Primary Care Provider] - 





- Discharge Summary/Plan Comment


DC Time >30 min.: No





- Patient Data


Vitals - Most Recent: 


 Last Vital Signs











Temp  97.5 F   05/12/17 12:00


 


Pulse  103 H  05/12/17 12:00


 


Resp  16   05/12/17 12:00


 


BP  130/60   05/12/17 12:00


 


Pulse Ox  96   05/12/17 12:00











Weight - Most Recent: 302 lb 8 oz


I&O - Last 24 hours: 


 Intake & Output











 05/11/17 05/12/17 05/12/17





 22:59 06:59 14:59


 


Intake Total 360  480


 


Output Total 


 


Balance -290 -348 -8363











Lab Results - Last 24 hrs: 


 Laboratory Results - last 24 hr











  05/11/17 05/11/17 05/12/17 Range/Units





  17:40 21:06 06:15 


 


WBC    7.2  (4.0-10.2)  K/uL


 


RBC    3.50 L  (4.33-5.41)  M/uL


 


Hgb    9.7 L  (13.1-16.8)  g/dL


 


Hct    33.7 L  (39.0-49.0)  %


 


MCV    96.3  (84.0-98.0)  fL


 


MCH    27.7 L  (28.2-33.3)  pg


 


MCHC    28.8 L  (31.7-36.0)  g/dL


 


RDW    15.3 H  (11.2-14.1)  %


 


Plt Count    236  (150-350)  K/uL


 


Neut % (Auto)    49.4  (45.0-80.0)  %


 


Lymph % (Auto)    32.5  (10.0-50.0)  %


 


Mono % (Auto)    9.7  (2.0-14.0)  %


 


Eos % (Auto)    8.3 H  (0.0-5.0)  %


 


Baso % (Auto)    0.1  (0.0-2.0)  %


 


Neut # (Auto)    3.56  (1.40-7.00)  K/uL


 


Lymph # (Auto)    2.35  (0.50-3.50)  K/uL


 


Mono # (Auto)    0.70  (0.00-1.00)  K/uL


 


Eos # (Auto)    0.60 H  (0.00-0.50)  K/uL


 


Baso # (Auto)    0.01  (0.00-0.20)  K/uL


 


PT     (9.8-11.7)  SEC


 


INR     


 


Sodium     (136-145)  mmol/L


 


Potassium     (3.5-5.1)  mmol/L


 


Chloride     ()  mmol/L


 


Carbon Dioxide     (21.0-32.0)  mmol/L


 


BUN     (7-18)  mg/dL


 


Creatinine     (0.51-1.17)  mg/dL


 


Est Cr Clr Drug Dosing     mL/min


 


Estimated GFR (MDRD)     mL/min


 


Glucose     ()  mg/dL


 


POC Glucose  119 H  135 H   ()  mg/dl


 


Calcium     (8.5-10.1)  mg/dL


 


Total Bilirubin     (0.2-1.0)  mg/dL


 


AST     (15-37)  U/L


 


ALT     (12-78)  U/L


 


Alkaline Phosphatase     ()  IU/L


 


C-Reactive Protein     (<=0.9)  mg/dL


 


Total Protein     (6.4-8.2)  g/dL


 


Albumin     (3.4-5.0)  g/dL














  05/12/17 05/12/17 05/12/17 Range/Units





  06:15 06:15 07:28 


 


WBC     (4.0-10.2)  K/uL


 


RBC     (4.33-5.41)  M/uL


 


Hgb     (13.1-16.8)  g/dL


 


Hct     (39.0-49.0)  %


 


MCV     (84.0-98.0)  fL


 


MCH     (28.2-33.3)  pg


 


MCHC     (31.7-36.0)  g/dL


 


RDW     (11.2-14.1)  %


 


Plt Count     (150-350)  K/uL


 


Neut % (Auto)     (45.0-80.0)  %


 


Lymph % (Auto)     (10.0-50.0)  %


 


Mono % (Auto)     (2.0-14.0)  %


 


Eos % (Auto)     (0.0-5.0)  %


 


Baso % (Auto)     (0.0-2.0)  %


 


Neut # (Auto)     (1.40-7.00)  K/uL


 


Lymph # (Auto)     (0.50-3.50)  K/uL


 


Mono # (Auto)     (0.00-1.00)  K/uL


 


Eos # (Auto)     (0.00-0.50)  K/uL


 


Baso # (Auto)     (0.00-0.20)  K/uL


 


PT  12.7 H    (9.8-11.7)  SEC


 


INR  1.2    


 


Sodium   141   (136-145)  mmol/L


 


Potassium   4.0   (3.5-5.1)  mmol/L


 


Chloride   104   ()  mmol/L


 


Carbon Dioxide   37.0 H   (21.0-32.0)  mmol/L


 


BUN   16   (7-18)  mg/dL


 


Creatinine   0.75   (0.51-1.17)  mg/dL


 


Est Cr Clr Drug Dosing   112.68   mL/min


 


Estimated GFR (MDRD)   > 60   mL/min


 


Glucose   91   ()  mg/dL


 


POC Glucose    92  ()  mg/dl


 


Calcium   8.8   (8.5-10.1)  mg/dL


 


Total Bilirubin   0.2   (0.2-1.0)  mg/dL


 


AST   17   (15-37)  U/L


 


ALT   13   (12-78)  U/L


 


Alkaline Phosphatase   83   ()  IU/L


 


C-Reactive Protein   7.7 H   (<=0.9)  mg/dL


 


Total Protein   6.4   (6.4-8.2)  g/dL


 


Albumin   2.4 L   (3.4-5.0)  g/dL














  05/12/17 Range/Units





  11:24 


 


WBC   (4.0-10.2)  K/uL


 


RBC   (4.33-5.41)  M/uL


 


Hgb   (13.1-16.8)  g/dL


 


Hct   (39.0-49.0)  %


 


MCV   (84.0-98.0)  fL


 


MCH   (28.2-33.3)  pg


 


MCHC   (31.7-36.0)  g/dL


 


RDW   (11.2-14.1)  %


 


Plt Count   (150-350)  K/uL


 


Neut % (Auto)   (45.0-80.0)  %


 


Lymph % (Auto)   (10.0-50.0)  %


 


Mono % (Auto)   (2.0-14.0)  %


 


Eos % (Auto)   (0.0-5.0)  %


 


Baso % (Auto)   (0.0-2.0)  %


 


Neut # (Auto)   (1.40-7.00)  K/uL


 


Lymph # (Auto)   (0.50-3.50)  K/uL


 


Mono # (Auto)   (0.00-1.00)  K/uL


 


Eos # (Auto)   (0.00-0.50)  K/uL


 


Baso # (Auto)   (0.00-0.20)  K/uL


 


PT   (9.8-11.7)  SEC


 


INR   


 


Sodium   (136-145)  mmol/L


 


Potassium   (3.5-5.1)  mmol/L


 


Chloride   ()  mmol/L


 


Carbon Dioxide   (21.0-32.0)  mmol/L


 


BUN   (7-18)  mg/dL


 


Creatinine   (0.51-1.17)  mg/dL


 


Est Cr Clr Drug Dosing   mL/min


 


Estimated GFR (MDRD)   mL/min


 


Glucose   ()  mg/dL


 


POC Glucose  144 H  ()  mg/dl


 


Calcium   (8.5-10.1)  mg/dL


 


Total Bilirubin   (0.2-1.0)  mg/dL


 


AST   (15-37)  U/L


 


ALT   (12-78)  U/L


 


Alkaline Phosphatase   ()  IU/L


 


C-Reactive Protein   (<=0.9)  mg/dL


 


Total Protein   (6.4-8.2)  g/dL


 


Albumin   (3.4-5.0)  g/dL











[facility] GRACE Results - Last 24 hrs: 


 Microbiology











 05/09/17 12:20 Wound Culture - Preliminary





 Foot, Left    Pseudomonas Aeruginosa





    Normal Tami


 


 05/08/17 18:00 Urine Culture - Final





 Urine, Clean Catch    Pseudomonas Aeruginosa











Med Orders - Current: 


 Current Medications





Acetaminophen (Tylenol)  650 mg PO Q4H PRN


   PRN Reason: Fever


Hydrocodone Bitart/Acetaminophen (Norco 325-5 Mg)  1 tab PO DAILY PRN


   PRN Reason: Pain


Hydrocodone Bitart/Acetaminophen (Norco 325-5 Mg)  2 tab PO BID@0800,1900 [facility]


   Last Admin: 05/12/17 07:30 Dose:  2 tab


Albuterol (Proventil Neb Soln)  2.5 mg INH Q2H PRN


   PRN Reason: SHORTNESS OF BREATH


Albuterol/Ipratropium (Duoneb 3.0-0.5 Mg/3 Ml)  3 ml NEB Q4HRRT PRN


   PRN Reason: Dyspnea


Albuterol/Ipratropium (Duoneb 3.0-0.5 Mg/3 Ml)  3 ml NEB Q6HRRT [facility]


   Last Admin: 05/12/17 14:12 Dose:  3 ml


Azithromycin (Zithromax)  500 mg PO DAILY [facility]


   Last Admin: 05/12/17 07:30 Dose:  500 mg


Bisacodyl (Dulcolax)  10 mg RECTAL DAILY PRN


   PRN Reason: Constipation


Budesonide (Pulmicort)  0.5 mg NEB BIDRT [facility]


   Last Admin: 05/12/17 07:29 Dose:  0.5 mg


Ceftazidime (Fortaz)  1 gm IVPUSH Q12H [facility]


   Last Admin: 05/12/17 09:14 Dose:  1 gm


Escitalopram Oxalate (Lexapro)  10 mg PO DAILY@0800 [facility]


   Last Admin: 05/12/17 07:31 Dose:  10 mg


Furosemide (Lasix)  40 mg IVPUSH DAILY [facility]


   Last Admin: 05/12/17 07:30 Dose:  40 mg


Gabapentin (Neurontin)  900 mg PO TID@1000,1500,1900 [facility]


   Last Admin: 05/12/17 09:15 Dose:  900 mg


Guaifenesin/Dextromethorphan (Mucinex Dm Er 600-30 Mg)  1 tab PO BID [facility]


   Last Admin: 05/12/17 07:29 Dose:  1 tab


Metronidazole 500 mg/ Premix  100 mls @ 100 mls/hr IV Q8H [facility]


   Last Admin: 05/12/17 10:59 Dose:  100 mls/hr


Insulin Detemir (Levemir)  18 unit SUBCUT DAILY@1000 [facility]


   Last Admin: 05/12/17 09:18 Dose:  18 units


Latanoprost (Xalatan 0.005% Ophth Soln)  0 ml EYEBOTH DAILY@1900 [facility]


   Last Admin: 05/11/17 20:48 Dose:  1 drop


Lorazepam (Ativan)  0.25 mg PO BID PRN


   PRN Reason: Anxiety


Lorazepam (Ativan)  0.5 mg PO TID@1000,1500,1900 [facility]


   Last Admin: 05/12/17 09:15 Dose:  0.5 mg


Magnesium Hydroxide (Milk Of Magnesia)  30 ml PO DAILY PRN


   PRN Reason: Constipation


Magnesium Oxide (Magnesium Oxide)  400 mg PO DAILY [facility]


   Last Admin: 05/12/17 07:31 Dose:  400 mg


Methadone HCl (Methadone)  10 mg PO BID@1000,1900 [facility]


   Last Admin: 05/12/17 09:16 Dose:  10 mg


Methyl Salicylate (Icy Hot Cream)  1 gm TOP BID PRN


   PRN Reason: Pain


Ipratropium Nasal (Spary 0.06%)  1 inh JOVITA DAILY@10 [facility]


   Last Admin: 05/12/17 09:17 Dose:  1 inh


Phenylephrine Hcl [ (Sudogest Pe] 10 Mg))  10 mg PO Q4HR PRN


   PRN Reason: Congestion


Polyethylene Glycol (Miralax)  17 gm PO DAILY@1500 [facility]


   Last Admin: 05/11/17 15:00 Dose:  17 gm


Potassium Chloride (Klor-Con M20)  20 meq PO TID [facility]


   Last Admin: 05/12/17 11:00 Dose:  20 meq


Quetiapine Fumarate (Seroquel)  25 mg PO TID@0800,1200,1900 [facility]


   Last Admin: 05/12/17 11:00 Dose:  25 mg


Saliva Substitute (Walt-Stir Oral Spray)  0 ml MUCMEM BID PRN


   PRN Reason: Dryness


Senna/Docusate Sodium (Senna Plus)  2 tab PO BID@1000,1900 [facility]


   Last Admin: 05/12/17 09:15 Dose:  2 tab


Simvastatin (Zocor)  10 mg PO DAILY@1900 [facility]


   Last Admin: 05/11/17 20:47 Dose:  10 mg


Sodium Chloride ([place] Nasal Spray)  0 ml JOVITA ASDIRECTED PRN


   PRN Reason: Dryness


Sodium Chloride (Saline Flush)  10 ml FLUSH Q12HR [facility]


   Last Admin: 05/12/17 07:34 Dose:  10 ml


Sotalol HCl (Betapace)  40 mg PO BID@1000,1900 [facility]


   Last Admin: 05/12/17 09:27 Dose:  40 mg


Theophylline (Theophylline Anhydrous)  300 mg PO BID@1000,1900 [facility]


   Last Admin: 05/12/17 09:15 Dose:  300 mg


Triamcinolone Acetonide (Triamcinolone Acetonide 0.1% Crm)  0 gm TOP BID PRN


   PRN Reason: Itching





Discontinued Medications





Acetaminophen (Tylenol)  650 mg PO Q4H PRN


   PRN Reason: Pain (Mild 1-3)/fever


Albuterol/Ipratropium (Duoneb 3.0-0.5 Mg/3 Ml)  3 ml NEB Q6HRRT [facility]


Enoxaparin Sodium (Lovenox)  100 mg SUBCUT ONETIME ONE


   Stop: 05/10/17 12:08


   Last Admin: 05/10/17 12:39 Dose:  100 mg


Enoxaparin Sodium (Lovenox)  100 mg SUBCUT ONETIME ONE


   Stop: 05/11/17 17:01


   Last Admin: 05/11/17 17:45 Dose:  100 mg


Enoxaparin Sodium (Lovenox)  100 mg SUBCUT ONETIME ONE


   Stop: 05/12/17 11:31


   Last Admin: 05/12/17 12:16 Dose:  100 mg


Famotidine (Pepcid)  40 mg IVPUSH ONETIME ONE


   Stop: 05/08/17 09:56


   Last Admin: 05/08/17 10:31 Dose:  40 mg


Furosemide (Lasix)  40 mg IVPUSH Q8H [facility]


   Last Admin: 05/09/17 14:56 Dose:  40 mg


Ceftriaxone Sodium 1 gm/ (Sodium Chloride)  100 mls @ 200 mls/hr IV ONETIME ONE


   Stop: 05/08/17 11:22


   Last Admin: 05/08/17 11:15 Dose:  200 mls/hr


Azithromycin 500 mg/ Sodium (Chloride)  250 mls @ 250 mls/hr IV Q24H [facility]


   Last Admin: 05/11/17 15:00 Dose:  250 mls/hr


Ceftriaxone Sodium 1 gm/ (Sodium Chloride)  100 mls @ 200 mls/hr IV Q12H [facility]


   Last Admin: 05/11/17 23:22 Dose:  200 mls/hr


Insulin Aspart (Novolog)  0 unit SUBCUT ACBED [facility]


   PRN Reason: Protocol


   Stop: 05/12/17 00:31


   Last Admin: 05/11/17 21:55 Dose:  Not Given


Iopamidol (Isovue-370 (76%))  100 ml IVPUSH ONETIME ONE


   Stop: 05/08/17 11:14


   Last Admin: 05/08/17 12:17 Dose:  100 ml


Potassium Chloride (Klor-Con M20)  60 meq PO TID [facility]


   Last Admin: 05/09/17 11:03 Dose:  60 meq


Potassium Chloride (Klor-Con M20)  40 meq PO TID [facility]


   Last Admin: 05/10/17 11:18 Dose:  40 meq


Sodium Chloride (Saline Flush)  10 ml FLUSH ASDIRECTED PRN


   PRN Reason: Keep Vein Open


   Last Admin: 05/12/17 11:02 Dose:  10 ml


Sodium Chloride (Saline Flush)  10 ml FLUSH Q12HR PRN


   PRN Reason: Keep Vein Open


   Last Admin: 05/09/17 08:31 Dose:  10 ml


Sodium Chloride (Saline Flush)  10 ml FLUSH Q12H PRN


   PRN Reason: Keep Vein Open


Warfarin Sodium 2 mg/ Warfarin (Sodium 5 mg)  7 mg PO ONETIME ONE


   Stop: 05/11/17 18:01


   Last Admin: 05/11/17 17:45 Dose:  7 mg











*Q Meaningful Use (DIS)





- VTE *Q


VTE Criteria *Q: 








- Stroke *Q


Stroke Criteria *Q: 








- AMI *Q


AMI Criteria *Q:

## 2017-05-12 NOTE — PCM.PN
- General Info


Date of Service: 05/11/17


Functional Status: Reports: pain controlled, tolerating diet





- Review of Systems


General: Reports: Weakness, Malaise


HEENT: Reports: no symptoms


Pulmonary: Reports: cough


Cardiovascular: Reports: No Symptoms


Gastrointestinal: Reports: No symptoms


Genitourinary: Reports: no symptoms


Musculoskeletal: Reports: no symptoms


Skin: Reports: no symptoms, other (ulcer foot)


Neurological: Reports: Weakness


Psychiatric: Reports: no symptoms, other (poor judgement)





- Patient Data


Vitals - most recent: 


 Last Vital Signs











Temp  97.4 F   05/11/17 20:00


 


Pulse  55 L  05/11/17 20:48


 


Resp  16   05/11/17 20:00


 


BP  107/60   05/11/17 20:48


 


Pulse Ox  96   05/11/17 20:00











Weight - most recent: 301 lb 8 oz


I&O - last 24 hours: 


 Intake & Output











 05/11/17 05/11/17 05/12/17





 14:59 22:59 06:59


 


Intake Total 360 360 


 


Output Total 1000 650 


 


Balance -640 -290 











Lab Results last 24 hrs: 


 Laboratory Results - last 24 hr











  05/10/17 05/11/17 05/11/17 Range/Units





  22:19 06:35 06:35 


 


WBC   10.8 H   (4.0-10.2)  K/uL


 


RBC   3.72 L   (4.33-5.41)  M/uL


 


Hgb   10.4 L   (13.1-16.8)  g/dL


 


Hct   35.8 L   (39.0-49.0)  %


 


MCV   96.2   (84.0-98.0)  fL


 


MCH   28.0 L   (28.2-33.3)  pg


 


MCHC   29.1 L   (31.7-36.0)  g/dL


 


RDW   15.2 H   (11.2-14.1)  %


 


Plt Count   244   (150-350)  K/uL


 


Neut % (Auto)   73.7   (45.0-80.0)  %


 


Lymph % (Auto)   14.0   (10.0-50.0)  %


 


Mono % (Auto)   7.6   (2.0-14.0)  %


 


Eos % (Auto)   4.5   (0.0-5.0)  %


 


Baso % (Auto)   0.2   (0.0-2.0)  %


 


Neut # (Auto)   7.95 H   (1.40-7.00)  K/uL


 


Lymph # (Auto)   1.51   (0.50-3.50)  K/uL


 


Mono # (Auto)   0.82   (0.00-1.00)  K/uL


 


Eos # (Auto)   0.49   (0.00-0.50)  K/uL


 


Baso # (Auto)   0.02   (0.00-0.20)  K/uL


 


PT    16.1 H D  (9.8-11.7)  SEC


 


INR    1.5  


 


APTT    33.5 H  (23.5-30.0)  SEC


 


Sodium     (136-145)  mmol/L


 


Potassium     (3.5-5.1)  mmol/L


 


Chloride     ()  mmol/L


 


Carbon Dioxide     (21.0-32.0)  mmol/L


 


BUN     (7-18)  mg/dL


 


Creatinine     (0.51-1.17)  mg/dL


 


Est Cr Clr Drug Dosing     mL/min


 


Estimated GFR (MDRD)     mL/min


 


Glucose     ()  mg/dL


 


POC Glucose  123 H    ()  mg/dl


 


Calcium     (8.5-10.1)  mg/dL


 


Total Bilirubin     (0.2-1.0)  mg/dL


 


AST     (15-37)  U/L


 


ALT     (12-78)  U/L


 


Alkaline Phosphatase     ()  IU/L


 


C-Reactive Protein     (<=0.9)  mg/dL


 


Total Protein     (6.4-8.2)  g/dL


 


Albumin     (3.4-5.0)  g/dL














  05/11/17 05/11/17 05/11/17 Range/Units





  06:35 07:13 11:34 


 


WBC     (4.0-10.2)  K/uL


 


RBC     (4.33-5.41)  M/uL


 


Hgb     (13.1-16.8)  g/dL


 


Hct     (39.0-49.0)  %


 


MCV     (84.0-98.0)  fL


 


MCH     (28.2-33.3)  pg


 


MCHC     (31.7-36.0)  g/dL


 


RDW     (11.2-14.1)  %


 


Plt Count     (150-350)  K/uL


 


Neut % (Auto)     (45.0-80.0)  %


 


Lymph % (Auto)     (10.0-50.0)  %


 


Mono % (Auto)     (2.0-14.0)  %


 


Eos % (Auto)     (0.0-5.0)  %


 


Baso % (Auto)     (0.0-2.0)  %


 


Neut # (Auto)     (1.40-7.00)  K/uL


 


Lymph # (Auto)     (0.50-3.50)  K/uL


 


Mono # (Auto)     (0.00-1.00)  K/uL


 


Eos # (Auto)     (0.00-0.50)  K/uL


 


Baso # (Auto)     (0.00-0.20)  K/uL


 


PT     (9.8-11.7)  SEC


 


INR     


 


APTT     (23.5-30.0)  SEC


 


Sodium  142    (136-145)  mmol/L


 


Potassium  4.1    (3.5-5.1)  mmol/L


 


Chloride  104    ()  mmol/L


 


Carbon Dioxide  37.1 H    (21.0-32.0)  mmol/L


 


BUN  15    (7-18)  mg/dL


 


Creatinine  0.75    (0.51-1.17)  mg/dL


 


Est Cr Clr Drug Dosing  112.68    mL/min


 


Estimated GFR (MDRD)  > 60    mL/min


 


Glucose  134 H    ()  mg/dL


 


POC Glucose   140 H  180 H  ()  mg/dl


 


Calcium  8.9    (8.5-10.1)  mg/dL


 


Total Bilirubin  0.2    (0.2-1.0)  mg/dL


 


AST  17    (15-37)  U/L


 


ALT  16    (12-78)  U/L


 


Alkaline Phosphatase  89    ()  IU/L


 


C-Reactive Protein  6.2 H    (<=0.9)  mg/dL


 


Total Protein  6.8    (6.4-8.2)  g/dL


 


Albumin  2.5 L    (3.4-5.0)  g/dL














  05/11/17 Range/Units





  17:40 


 


WBC   (4.0-10.2)  K/uL


 


RBC   (4.33-5.41)  M/uL


 


Hgb   (13.1-16.8)  g/dL


 


Hct   (39.0-49.0)  %


 


MCV   (84.0-98.0)  fL


 


MCH   (28.2-33.3)  pg


 


MCHC   (31.7-36.0)  g/dL


 


RDW   (11.2-14.1)  %


 


Plt Count   (150-350)  K/uL


 


Neut % (Auto)   (45.0-80.0)  %


 


Lymph % (Auto)   (10.0-50.0)  %


 


Mono % (Auto)   (2.0-14.0)  %


 


Eos % (Auto)   (0.0-5.0)  %


 


Baso % (Auto)   (0.0-2.0)  %


 


Neut # (Auto)   (1.40-7.00)  K/uL


 


Lymph # (Auto)   (0.50-3.50)  K/uL


 


Mono # (Auto)   (0.00-1.00)  K/uL


 


Eos # (Auto)   (0.00-0.50)  K/uL


 


Baso # (Auto)   (0.00-0.20)  K/uL


 


PT   (9.8-11.7)  SEC


 


INR   


 


APTT   (23.5-30.0)  SEC


 


Sodium   (136-145)  mmol/L


 


Potassium   (3.5-5.1)  mmol/L


 


Chloride   ()  mmol/L


 


Carbon Dioxide   (21.0-32.0)  mmol/L


 


BUN   (7-18)  mg/dL


 


Creatinine   (0.51-1.17)  mg/dL


 


Est Cr Clr Drug Dosing   mL/min


 


Estimated GFR (MDRD)   mL/min


 


Glucose   ()  mg/dL


 


POC Glucose  119 H  ()  mg/dl


 


Calcium   (8.5-10.1)  mg/dL


 


Total Bilirubin   (0.2-1.0)  mg/dL


 


AST   (15-37)  U/L


 


ALT   (12-78)  U/L


 


Alkaline Phosphatase   ()  IU/L


 


C-Reactive Protein   (<=0.9)  mg/dL


 


Total Protein   (6.4-8.2)  g/dL


 


Albumin   (3.4-5.0)  g/dL











Maximilian Results last 24 hrs: 


 Microbiology











 05/09/17 12:20 Wound Culture - Preliminary





 Foot, Left    Pseudomonas Aeruginosa





    Normal Tami


 


 05/08/17 18:00 Urine Culture - Final





 Urine, Clean Catch    Pseudomonas Aeruginosa


 


 05/10/17 07:05 Mycoplasma Serology - Final





 Blood 











Med Orders - Current: 


 Current Medications





Acetaminophen (Tylenol)  650 mg PO Q4H PRN


   PRN Reason: Fever


Hydrocodone Bitart/Acetaminophen (Norco 325-5 Mg)  1 tab PO DAILY PRN


   PRN Reason: Pain


Hydrocodone Bitart/Acetaminophen (Norco 325-5 Mg)  2 tab PO BID@0800,1900 [facility]


   Last Admin: 05/11/17 20:45 Dose:  2 tab


Albuterol (Proventil Neb Soln)  2.5 mg INH Q2H PRN


   PRN Reason: SHORTNESS OF BREATH


Albuterol/Ipratropium (Duoneb 3.0-0.5 Mg/3 Ml)  3 ml NEB Q4HRRT PRN


   PRN Reason: Dyspnea


Albuterol/Ipratropium (Duoneb 3.0-0.5 Mg/3 Ml)  3 ml NEB Q6HRRT [facility]


   Last Admin: 05/11/17 20:45 Dose:  3 ml


Azithromycin (Zithromax)  500 mg PO DAILY [facility]


Bisacodyl (Dulcolax)  10 mg RECTAL DAILY PRN


   PRN Reason: Constipation


Budesonide (Pulmicort)  0.5 mg NEB BIDRT [facility]


   Last Admin: 05/11/17 20:44 Dose:  0.5 mg


Escitalopram Oxalate (Lexapro)  10 mg PO DAILY@0800 [facility]


   Last Admin: 05/11/17 07:26 Dose:  10 mg


Furosemide (Lasix)  40 mg IVPUSH DAILY [facility]


   Last Admin: 05/11/17 07:29 Dose:  40 mg


Gabapentin (Neurontin)  900 mg PO TID@1000,1500,1900 [facility]


   Last Admin: 05/11/17 20:46 Dose:  900 mg


Guaifenesin/Dextromethorphan (Mucinex Dm Er 600-30 Mg)  1 tab PO BID [facility]


   Last Admin: 05/11/17 17:45 Dose:  1 tab


Metronidazole 500 mg/ Premix  100 mls @ 100 mls/hr IV Q8H [facility]


   Last Admin: 05/11/17 20:44 Dose:  100 mls/hr


Insulin Detemir (Levemir)  18 unit SUBCUT DAILY@1000 [facility]


   Last Admin: 05/11/17 09:38 Dose:  18 units


Latanoprost (Xalatan 0.005% Ophth Soln)  0 ml EYEBOTH DAILY@1900 [facility]


   Last Admin: 05/11/17 20:48 Dose:  1 drop


Lorazepam (Ativan)  0.25 mg PO BID PRN


   PRN Reason: Anxiety


Lorazepam (Ativan)  0.5 mg PO TID@1000,1500,1900 [facility]


   Last Admin: 05/11/17 20:47 Dose:  0.5 mg


Magnesium Hydroxide (Milk Of Magnesia)  30 ml PO DAILY PRN


   PRN Reason: Constipation


Magnesium Oxide (Magnesium Oxide)  400 mg PO DAILY [facility]


   Last Admin: 05/11/17 07:26 Dose:  400 mg


Methadone HCl (Methadone)  10 mg PO BID@1000,1900 [facility]


   Last Admin: 05/11/17 20:47 Dose:  10 mg


Methyl Salicylate (Icy Hot Cream)  1 gm TOP BID PRN


   PRN Reason: Pain


Ipratropium Nasal (Spary 0.06%)  1 inh JOVITA DAILY@10 [facility]


   Last Admin: 05/11/17 09:40 Dose:  1 inh


Phenylephrine Hcl [ (Sudogest Pe] 10 Mg))  10 mg PO Q4HR PRN


   PRN Reason: Congestion


Polyethylene Glycol (Miralax)  17 gm PO DAILY@1500 [facility]


   Last Admin: 05/11/17 15:00 Dose:  17 gm


Potassium Chloride (Klor-Con M20)  20 meq PO TID [facility]


   Last Admin: 05/11/17 17:45 Dose:  20 meq


Quetiapine Fumarate (Seroquel)  25 mg PO TID@0800,1200,1900 [facility]


   Last Admin: 05/11/17 20:46 Dose:  25 mg


Saliva Substitute (Walt-Stir Oral Spray)  0 ml MUCMEM BID PRN


   PRN Reason: Dryness


Senna/Docusate Sodium (Senna Plus)  2 tab PO BID@1000,1900 [facility]


   Last Admin: 05/11/17 20:46 Dose:  2 tab


Simvastatin (Zocor)  10 mg PO DAILY@1900 [facility]


   Last Admin: 05/11/17 20:47 Dose:  10 mg


Sodium Chloride (Saline Flush)  10 ml FLUSH ASDIRECTED PRN


   PRN Reason: Keep Vein Open


   Last Admin: 05/11/17 11:15 Dose:  10 ml


Sodium Chloride (Ocean Nasal Spray)  0 ml JOVITA ASDIRECTED PRN


   PRN Reason: Dryness


Sodium Chloride (Saline Flush)  10 ml FLUSH Q12HR [facility]


   Last Admin: 05/11/17 20:48 Dose:  10 ml


Sotalol HCl (Betapace)  40 mg PO BID@1000,1900 [facility]


   Last Admin: 05/11/17 20:48 Dose:  Not Given


Theophylline (Theophylline Anhydrous)  300 mg PO BID@1000,1900 [facility]


   Last Admin: 05/11/17 20:47 Dose:  300 mg


Triamcinolone Acetonide (Triamcinolone Acetonide 0.1% Crm)  0 gm TOP BID PRN


   PRN Reason: Itching





Discontinued Medications





Acetaminophen (Tylenol)  650 mg PO Q4H PRN


   PRN Reason: Pain (Mild 1-3)/fever


Albuterol/Ipratropium (Duoneb 3.0-0.5 Mg/3 Ml)  3 ml NEB Q6HRRT [facility]


Enoxaparin Sodium (Lovenox)  100 mg SUBCUT ONETIME ONE


   Stop: 05/10/17 12:08


   Last Admin: 05/10/17 12:39 Dose:  100 mg


Enoxaparin Sodium (Lovenox)  100 mg SUBCUT ONETIME ONE


   Stop: 05/11/17 17:01


   Last Admin: 05/11/17 17:45 Dose:  100 mg


Famotidine (Pepcid)  40 mg IVPUSH ONETIME ONE


   Stop: 05/08/17 09:56


   Last Admin: 05/08/17 10:31 Dose:  40 mg


Furosemide (Lasix)  40 mg IVPUSH Q8H [facility]


   Last Admin: 05/09/17 14:56 Dose:  40 mg


Ceftriaxone Sodium 1 gm/ (Sodium Chloride)  100 mls @ 200 mls/hr IV ONETIME ONE


   Stop: 05/08/17 11:22


   Last Admin: 05/08/17 11:15 Dose:  200 mls/hr


Azithromycin 500 mg/ Sodium (Chloride)  250 mls @ 250 mls/hr IV Q24H [facility]


   Last Admin: 05/11/17 15:00 Dose:  250 mls/hr


Ceftriaxone Sodium 1 gm/ (Sodium Chloride)  100 mls @ 200 mls/hr IV Q12H [facility]


   Last Admin: 05/11/17 23:22 Dose:  200 mls/hr


Insulin Aspart (Novolog)  0 unit SUBCUT ACBED [facility]


   PRN Reason: Protocol


   Stop: 05/12/17 00:31


   Last Admin: 05/11/17 21:55 Dose:  Not Given


Iopamidol (Isovue-370 (76%))  100 ml IVPUSH ONETIME ONE


   Stop: 05/08/17 11:14


   Last Admin: 05/08/17 12:17 Dose:  100 ml


Potassium Chloride (Klor-Con M20)  60 meq PO TID [facility]


   Last Admin: 05/09/17 11:03 Dose:  60 meq


Potassium Chloride (Klor-Con M20)  40 meq PO TID [facility]


   Last Admin: 05/10/17 11:18 Dose:  40 meq


Sodium Chloride (Saline Flush)  10 ml FLUSH Q12HR PRN


   PRN Reason: Keep Vein Open


   Last Admin: 05/09/17 08:31 Dose:  10 ml


Sodium Chloride (Saline Flush)  10 ml FLUSH Q12H PRN


   PRN Reason: Keep Vein Open


Warfarin Sodium 2 mg/ Warfarin (Sodium 5 mg)  7 mg PO ONETIME ONE


   Stop: 05/11/17 18:01


   Last Admin: 05/11/17 17:45 Dose:  7 mg











- Exam


Quality Assessment: supplemental oxygen, central line/PICC


General: alert, no acute distress


HEENT: Mucous membr. moist/pink


Neck: trachea midline, no JVD


Lungs: Normal respiratory effort, Decreased breath sounds, Rhonchi (RLL)


Cardiovascular: Irregular Rhythm


Abdomen: bowel sounds present, soft, no tenderness, no distension


 (Male) Exam: Deferred


Back Exam: Normal Inspection


Extremities: no calf tenderness, edema


Skin: ecchymosis


Wound/Incisions: other (foot ulcer necrotic base)


Neurological: no new focal deficit


Psy/Mental Status: alert, other (flat affect)





- Problem List & Annotations


(1) Anemia


SNOMED Code(s): 126846637


   Code(s): D64.9 - ANEMIA, UNSPECIFIED   Status: Acute   Priority: Medium   

Current Visit: Yes   Onset Date: ~05/08/17   


Qualifiers: 


   Anemia type: unspecified type   Qualified Code(s): D64.9 - Anemia, 

unspecified   


Annotation/Comment:: Note mild anemia today with current Coumadin therapy.  

Observe for now.  Hemoccult during this hospitalization.  Further workup 

depending on his clinical course   





(2) CHF (congestive heart failure)


SNOMED Code(s): 44390031


   Code(s): I50.9 - HEART FAILURE, UNSPECIFIED   Status: Acute   Priority: High

   Current Visit: Yes   Onset Date: 05/08/17   


Qualifiers: 


   Congestive heart failure type: unspecified congestive heart failure type   

Congestive heart failure chronicity: acute on chronic   Qualified Code(s): 

I50.9 - Heart failure, unspecified   


Annotation/Comment:: Initiate IV Lasix therapy.  Note that patient is no code 

with no further workup, including echocardiogram, etc..  No recent chest pain 

or anginal-type symptoms.  Initiate standard rule out MI orders, however.  Note 

status post CABG therapy   





(3) COPD (chronic obstructive pulmonary disease)


SNOMED Code(s): 10025836


   Code(s): J44.9 - CHRONIC OBSTRUCTIVE PULMONARY DISEASE, UNSPECIFIED   Status

: Acute   Priority: Medium   Current Visit: Yes   


Qualifiers: 


   COPD type: COPD with acute lower respiratory infection   Qualified Code(s): 

J44.0 - Chronic obstructive pulmonary disease with acute lower respiratory 

infection   


Annotation/Comment:: Theophylline level with next set of blood work.  Otherwise 

continue aggressive antibiotic and nebulizer therapy   





(4) PE, Pulmonary embolism


SNOMED Code(s): 28796705


   Code(s): I26.99 - OTHER PULMONARY EMBOLISM WITHOUT ACUTE COR PULMONALE   

Status: Acute   Priority: High   Current Visit: Yes   Onset Date: 09/30/15   

Annotation/Comment:: Note previous bilateral PEs on 9/30/15 with new left 

lingual PE today by CT scan as above.  Patient is already on Coumadin therapy 

with therapeutic INR.  No further additional therapy at this time   





(5) Pneumonia


SNOMED Code(s): 347913543


   Code(s): J18.9 - PNEUMONIA, UNSPECIFIED ORGANISM   Status: Acute   Priority: 

High   Current Visit: Yes   Onset Date: ~05/08/17   


Qualifiers: 


   Pneumonia type: due to unspecified organism   Laterality: bilateral   Lung 

location: lower lobe of lung   Qualified Code(s): J18.9 - Pneumonia, 

unspecified organism   


Annotation/Comment:: [facility] [place] [facility] 

[facility] unknown type.  [facility] with no beds available in their facility.  [facility] assumes care in the a.m.  

Blood cultures x2 were collected in the emergency room.  Attempt to obtain 

sputum specimen for culture and sensitivity.  Influenza screen negative today.  

IV Rocephin therapy initiated in the emergency room.  The patient was 

successfully tapered to O2 therapy at 5 L per minute by nasal cannula prior to 

admission   





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


SNOMED Code(s): 014728321561866


   Code(s): E11.621 - TYPE 2 DIABETES MELLITUS WITH FOOT ULCER; L97.409 - NON-

PRS CHRONIC ULCER OF UNSP HEEL AND MIDFOOT W UNSP SEVERT   Status: Acute   

Priority: High   Current Visit: Yes   





- Problem List Review


Problem List Initiated/Reviewed/Updated: Yes





- My Orders


Last 24 Hours: 


My Active Orders





05/12/17 05:11


CBC WITH AUTO DIFF [HEME] DAILY 


CMP [COMPREHENSIVE METABOLIC PN,CMP] [CHEM] DAILY 


CRP [C-REACTIVE PROTEIN] [CHEM] DAILY 


INR,PT,PROTHROMBIN TIME [COAG] DAILY 





05/12/17 08:00


Azithromycin [Zithromax]   500 mg PO DAILY 





05/12/17 10:00


cefTAZidime [Fortaz]   1 gm IVPUSH Q12H 














- Plan


Plan:: 





05/09/17


Jorge A Guillaume MD


Feels better today. He does not want to be transferred to [facility] today. 

He also has me talk to his wife and she agrees to him staying in [place] at 

[facility]. Continue medical plan.





05/10/17


Jorge A Guillaume MD


Continues to feel better. He does not want transfer to [facility] hospital. 

Neuropathic ulcer foot secondary to type 2 DM noted. Surgical consult for 

possible debridement. PICC discussed due to difficult IV access and need for 

prolonged antibiotics.





05/11/17 (late entry)


Jorge A Guillaume MD


He feels better. Dr. Arora evaluated ulcer and recommended debridement but Rey 

refused recommendation. Refused to allow Dr. Arora to debride ulcer. He says he 

will follow up at [facility] for ulcer care. Continue IV antibiotics.

## 2017-05-12 NOTE — PCM.PN
- General Info


Date of Service: 05/12/17


Functional Status: Reports: pain controlled





- Review of Systems


General: Reports: No Symptoms


HEENT: Reports: no symptoms


Pulmonary: Reports: shortness of breath


Cardiovascular: Reports: No Symptoms


Gastrointestinal: Reports: No symptoms


Genitourinary: Reports: no symptoms


Musculoskeletal: Reports: no symptoms


Skin: Reports: no symptoms, bruising, other (ulcer foot)


Neurological: Reports: No Symptoms


Psychiatric: Reports: no symptoms





- Patient Data


Vitals - most recent: 


 Last Vital Signs











Temp  97.5 F   05/12/17 12:00


 


Pulse  103 H  05/12/17 12:00


 


Resp  16   05/12/17 12:00


 


BP  130/60   05/12/17 12:00


 


Pulse Ox  96   05/12/17 12:00











Weight - most recent: 302 lb 8 oz


I&O - last 24 hours: 


 Intake & Output











 05/11/17 05/12/17 05/12/17





 22:59 06:59 14:59


 


Intake Total 360  480


 


Output Total 


 


Balance -290 -550 -1320











Lab Results last 24 hrs: 


 Laboratory Results - last 24 hr











  05/11/17 05/11/17 05/12/17 Range/Units





  17:40 21:06 06:15 


 


WBC    7.2  (4.0-10.2)  K/uL


 


RBC    3.50 L  (4.33-5.41)  M/uL


 


Hgb    9.7 L  (13.1-16.8)  g/dL


 


Hct    33.7 L  (39.0-49.0)  %


 


MCV    96.3  (84.0-98.0)  fL


 


MCH    27.7 L  (28.2-33.3)  pg


 


MCHC    28.8 L  (31.7-36.0)  g/dL


 


RDW    15.3 H  (11.2-14.1)  %


 


Plt Count    236  (150-350)  K/uL


 


Neut % (Auto)    49.4  (45.0-80.0)  %


 


Lymph % (Auto)    32.5  (10.0-50.0)  %


 


Mono % (Auto)    9.7  (2.0-14.0)  %


 


Eos % (Auto)    8.3 H  (0.0-5.0)  %


 


Baso % (Auto)    0.1  (0.0-2.0)  %


 


Neut # (Auto)    3.56  (1.40-7.00)  K/uL


 


Lymph # (Auto)    2.35  (0.50-3.50)  K/uL


 


Mono # (Auto)    0.70  (0.00-1.00)  K/uL


 


Eos # (Auto)    0.60 H  (0.00-0.50)  K/uL


 


Baso # (Auto)    0.01  (0.00-0.20)  K/uL


 


PT     (9.8-11.7)  SEC


 


INR     


 


Sodium     (136-145)  mmol/L


 


Potassium     (3.5-5.1)  mmol/L


 


Chloride     ()  mmol/L


 


Carbon Dioxide     (21.0-32.0)  mmol/L


 


BUN     (7-18)  mg/dL


 


Creatinine     (0.51-1.17)  mg/dL


 


Est Cr Clr Drug Dosing     mL/min


 


Estimated GFR (MDRD)     mL/min


 


Glucose     ()  mg/dL


 


POC Glucose  119 H  135 H   ()  mg/dl


 


Calcium     (8.5-10.1)  mg/dL


 


Total Bilirubin     (0.2-1.0)  mg/dL


 


AST     (15-37)  U/L


 


ALT     (12-78)  U/L


 


Alkaline Phosphatase     ()  IU/L


 


C-Reactive Protein     (<=0.9)  mg/dL


 


Total Protein     (6.4-8.2)  g/dL


 


Albumin     (3.4-5.0)  g/dL














  05/12/17 05/12/17 05/12/17 Range/Units





  06:15 06:15 07:28 


 


WBC     (4.0-10.2)  K/uL


 


RBC     (4.33-5.41)  M/uL


 


Hgb     (13.1-16.8)  g/dL


 


Hct     (39.0-49.0)  %


 


MCV     (84.0-98.0)  fL


 


MCH     (28.2-33.3)  pg


 


MCHC     (31.7-36.0)  g/dL


 


RDW     (11.2-14.1)  %


 


Plt Count     (150-350)  K/uL


 


Neut % (Auto)     (45.0-80.0)  %


 


Lymph % (Auto)     (10.0-50.0)  %


 


Mono % (Auto)     (2.0-14.0)  %


 


Eos % (Auto)     (0.0-5.0)  %


 


Baso % (Auto)     (0.0-2.0)  %


 


Neut # (Auto)     (1.40-7.00)  K/uL


 


Lymph # (Auto)     (0.50-3.50)  K/uL


 


Mono # (Auto)     (0.00-1.00)  K/uL


 


Eos # (Auto)     (0.00-0.50)  K/uL


 


Baso # (Auto)     (0.00-0.20)  K/uL


 


PT  12.7 H    (9.8-11.7)  SEC


 


INR  1.2    


 


Sodium   141   (136-145)  mmol/L


 


Potassium   4.0   (3.5-5.1)  mmol/L


 


Chloride   104   ()  mmol/L


 


Carbon Dioxide   37.0 H   (21.0-32.0)  mmol/L


 


BUN   16   (7-18)  mg/dL


 


Creatinine   0.75   (0.51-1.17)  mg/dL


 


Est Cr Clr Drug Dosing   112.68   mL/min


 


Estimated GFR (MDRD)   > 60   mL/min


 


Glucose   91   ()  mg/dL


 


POC Glucose    92  ()  mg/dl


 


Calcium   8.8   (8.5-10.1)  mg/dL


 


Total Bilirubin   0.2   (0.2-1.0)  mg/dL


 


AST   17   (15-37)  U/L


 


ALT   13   (12-78)  U/L


 


Alkaline Phosphatase   83   ()  IU/L


 


C-Reactive Protein   7.7 H   (<=0.9)  mg/dL


 


Total Protein   6.4   (6.4-8.2)  g/dL


 


Albumin   2.4 L   (3.4-5.0)  g/dL














  05/12/17 Range/Units





  11:24 


 


WBC   (4.0-10.2)  K/uL


 


RBC   (4.33-5.41)  M/uL


 


Hgb   (13.1-16.8)  g/dL


 


Hct   (39.0-49.0)  %


 


MCV   (84.0-98.0)  fL


 


MCH   (28.2-33.3)  pg


 


MCHC   (31.7-36.0)  g/dL


 


RDW   (11.2-14.1)  %


 


Plt Count   (150-350)  K/uL


 


Neut % (Auto)   (45.0-80.0)  %


 


Lymph % (Auto)   (10.0-50.0)  %


 


Mono % (Auto)   (2.0-14.0)  %


 


Eos % (Auto)   (0.0-5.0)  %


 


Baso % (Auto)   (0.0-2.0)  %


 


Neut # (Auto)   (1.40-7.00)  K/uL


 


Lymph # (Auto)   (0.50-3.50)  K/uL


 


Mono # (Auto)   (0.00-1.00)  K/uL


 


Eos # (Auto)   (0.00-0.50)  K/uL


 


Baso # (Auto)   (0.00-0.20)  K/uL


 


PT   (9.8-11.7)  SEC


 


INR   


 


Sodium   (136-145)  mmol/L


 


Potassium   (3.5-5.1)  mmol/L


 


Chloride   ()  mmol/L


 


Carbon Dioxide   (21.0-32.0)  mmol/L


 


BUN   (7-18)  mg/dL


 


Creatinine   (0.51-1.17)  mg/dL


 


Est Cr Clr Drug Dosing   mL/min


 


Estimated GFR (MDRD)   mL/min


 


Glucose   ()  mg/dL


 


POC Glucose  144 H  ()  mg/dl


 


Calcium   (8.5-10.1)  mg/dL


 


Total Bilirubin   (0.2-1.0)  mg/dL


 


AST   (15-37)  U/L


 


ALT   (12-78)  U/L


 


Alkaline Phosphatase   ()  IU/L


 


C-Reactive Protein   (<=0.9)  mg/dL


 


Total Protein   (6.4-8.2)  g/dL


 


Albumin   (3.4-5.0)  g/dL











Maximilian Results last 24 hrs: 


 Microbiology











 05/09/17 12:20 Wound Culture - Preliminary





 Foot, Left    Pseudomonas Aeruginosa





    Normal Tami


 


 05/08/17 18:00 Urine Culture - Final





 Urine, Clean Catch    Pseudomonas Aeruginosa











Med Orders - Current: 


 Current Medications





Acetaminophen (Tylenol)  650 mg PO Q4H PRN


   PRN Reason: Fever


Hydrocodone Bitart/Acetaminophen (Norco 325-5 Mg)  1 tab PO DAILY PRN


   PRN Reason: Pain


Hydrocodone Bitart/Acetaminophen (Norco 325-5 Mg)  2 tab PO BID@0800,1900 [facility]


   Last Admin: 05/12/17 07:30 Dose:  2 tab


Albuterol (Proventil Neb Soln)  2.5 mg INH Q2H PRN


   PRN Reason: SHORTNESS OF BREATH


Albuterol/Ipratropium (Duoneb 3.0-0.5 Mg/3 Ml)  3 ml NEB Q4HRRT PRN


   PRN Reason: Dyspnea


Albuterol/Ipratropium (Duoneb 3.0-0.5 Mg/3 Ml)  3 ml NEB Q6HRRT [facility]


   Last Admin: 05/12/17 14:12 Dose:  3 ml


Azithromycin (Zithromax)  500 mg PO DAILY [facility]


   Last Admin: 05/12/17 07:30 Dose:  500 mg


Bisacodyl (Dulcolax)  10 mg RECTAL DAILY PRN


   PRN Reason: Constipation


Budesonide (Pulmicort)  0.5 mg NEB BIDRT [facility]


   Last Admin: 05/12/17 07:29 Dose:  0.5 mg


Ceftazidime (Fortaz)  1 gm IVPUSH Q12H [facility]


   Last Admin: 05/12/17 09:14 Dose:  1 gm


Escitalopram Oxalate (Lexapro)  10 mg PO DAILY@0800 [facility]


   Last Admin: 05/12/17 07:31 Dose:  10 mg


Furosemide (Lasix)  40 mg IVPUSH DAILY [facility]


   Last Admin: 05/12/17 07:30 Dose:  40 mg


Gabapentin (Neurontin)  900 mg PO TID@1000,1500,1900 [facility]


   Last Admin: 05/12/17 09:15 Dose:  900 mg


Guaifenesin/Dextromethorphan (Mucinex Dm Er 600-30 Mg)  1 tab PO BID [facility]


   Last Admin: 05/12/17 07:29 Dose:  1 tab


Metronidazole 500 mg/ Premix  100 mls @ 100 mls/hr IV Q8H [facility]


   Last Admin: 05/12/17 10:59 Dose:  100 mls/hr


Insulin Detemir (Levemir)  18 unit SUBCUT DAILY@1000 [facility]


   Last Admin: 05/12/17 09:18 Dose:  18 units


Latanoprost (Xalatan 0.005% Ophth Soln)  0 ml EYEBOTH DAILY@1900 [facility]


   Last Admin: 05/11/17 20:48 Dose:  1 drop


Lorazepam (Ativan)  0.25 mg PO BID PRN


   PRN Reason: Anxiety


Lorazepam (Ativan)  0.5 mg PO TID@1000,1500,1900 [facility]


   Last Admin: 05/12/17 09:15 Dose:  0.5 mg


Magnesium Hydroxide (Milk Of Magnesia)  30 ml PO DAILY PRN


   PRN Reason: Constipation


Magnesium Oxide (Magnesium Oxide)  400 mg PO DAILY [facility]


   Last Admin: 05/12/17 07:31 Dose:  400 mg


Methadone HCl (Methadone)  10 mg PO BID@1000,1900 [facility]


   Last Admin: 05/12/17 09:16 Dose:  10 mg


Methyl Salicylate (Icy Hot Cream)  1 gm TOP BID PRN


   PRN Reason: Pain


Ipratropium Nasal (Spary 0.06%)  1 inh JOVITA DAILY@10 [facility]


   Last Admin: 05/12/17 09:17 Dose:  1 inh


Phenylephrine Hcl [ (Sudogest Pe] 10 Mg))  10 mg PO Q4HR PRN


   PRN Reason: Congestion


Polyethylene Glycol (Miralax)  17 gm PO DAILY@1500 [facility]


   Last Admin: 05/11/17 15:00 Dose:  17 gm


Potassium Chloride (Klor-Con M20)  20 meq PO TID [facility]


   Last Admin: 05/12/17 11:00 Dose:  20 meq


Quetiapine Fumarate (Seroquel)  25 mg PO TID@0800,1200,1900 [facility]


   Last Admin: 05/12/17 11:00 Dose:  25 mg


Saliva Substitute (Walt-Stir Oral Spray)  0 ml MUCMEM BID PRN


   PRN Reason: Dryness


Senna/Docusate Sodium (Senna Plus)  2 tab PO BID@1000,1900 [facility]


   Last Admin: 05/12/17 09:15 Dose:  2 tab


Simvastatin (Zocor)  10 mg PO DAILY@1900 [facility]


   Last Admin: 05/11/17 20:47 Dose:  10 mg


Sodium Chloride (Ocean Nasal Spray)  0 ml JOVITA ASDIRECTED PRN


   PRN Reason: Dryness


Sodium Chloride (Saline Flush)  10 ml FLUSH Q12HR [facility]


   Last Admin: 05/12/17 07:34 Dose:  10 ml


Sotalol HCl (Betapace)  40 mg PO BID@1000,1900 [facility]


   Last Admin: 05/12/17 09:27 Dose:  40 mg


Theophylline (Theophylline Anhydrous)  300 mg PO BID@1000,1900 [facility]


   Last Admin: 05/12/17 09:15 Dose:  300 mg


Triamcinolone Acetonide (Triamcinolone Acetonide 0.1% Crm)  0 gm TOP BID PRN


   PRN Reason: Itching





Discontinued Medications





Acetaminophen (Tylenol)  650 mg PO Q4H PRN


   PRN Reason: Pain (Mild 1-3)/fever


Albuterol/Ipratropium (Duoneb 3.0-0.5 Mg/3 Ml)  3 ml NEB Q6HRRT [facility]


Enoxaparin Sodium (Lovenox)  100 mg SUBCUT ONETIME ONE


   Stop: 05/10/17 12:08


   Last Admin: 05/10/17 12:39 Dose:  100 mg


Enoxaparin Sodium (Lovenox)  100 mg SUBCUT ONETIME ONE


   Stop: 05/11/17 17:01


   Last Admin: 05/11/17 17:45 Dose:  100 mg


Enoxaparin Sodium (Lovenox)  100 mg SUBCUT ONETIME ONE


   Stop: 05/12/17 11:31


   Last Admin: 05/12/17 12:16 Dose:  100 mg


Famotidine (Pepcid)  40 mg IVPUSH ONETIME ONE


   Stop: 05/08/17 09:56


   Last Admin: 05/08/17 10:31 Dose:  40 mg


Furosemide (Lasix)  40 mg IVPUSH Q8H [facility]


   Last Admin: 05/09/17 14:56 Dose:  40 mg


Ceftriaxone Sodium 1 gm/ (Sodium Chloride)  100 mls @ 200 mls/hr IV ONETIME ONE


   Stop: 05/08/17 11:22


   Last Admin: 05/08/17 11:15 Dose:  200 mls/hr


Azithromycin 500 mg/ Sodium (Chloride)  250 mls @ 250 mls/hr IV Q24H [facility]


   Last Admin: 05/11/17 15:00 Dose:  250 mls/hr


Ceftriaxone Sodium 1 gm/ (Sodium Chloride)  100 mls @ 200 mls/hr IV Q12H [facility]


   Last Admin: 05/11/17 23:22 Dose:  200 mls/hr


Insulin Aspart (Novolog)  0 unit SUBCUT ACBED [facility]


   PRN Reason: Protocol


   Stop: 05/12/17 00:31


   Last Admin: 05/11/17 21:55 Dose:  Not Given


Iopamidol (Isovue-370 (76%))  100 ml IVPUSH ONETIME ONE


   Stop: 05/08/17 11:14


   Last Admin: 05/08/17 12:17 Dose:  100 ml


Potassium Chloride (Klor-Con M20)  60 meq PO TID [facility]


   Last Admin: 05/09/17 11:03 Dose:  60 meq


Potassium Chloride (Klor-Con M20)  40 meq PO TID [facility]


   Last Admin: 05/10/17 11:18 Dose:  40 meq


Sodium Chloride (Saline Flush)  10 ml FLUSH ASDIRECTED PRN


   PRN Reason: Keep Vein Open


   Last Admin: 05/12/17 11:02 Dose:  10 ml


Sodium Chloride (Saline Flush)  10 ml FLUSH Q12HR PRN


   PRN Reason: Keep Vein Open


   Last Admin: 05/09/17 08:31 Dose:  10 ml


Sodium Chloride (Saline Flush)  10 ml FLUSH Q12H PRN


   PRN Reason: Keep Vein Open


Warfarin Sodium 2 mg/ Warfarin (Sodium 5 mg)  7 mg PO ONETIME ONE


   Stop: 05/11/17 18:01


   Last Admin: 05/11/17 17:45 Dose:  7 mg











- Exam


Quality Assessment: supplemental oxygen, central line/PICC


General: alert, cooperative, no acute distress


HEENT: Pupils equal, Pupils reactive, EOMI, Mucous membr. moist/pink


Neck: supple


Lungs: Normal respiratory effort, Decreased breath sounds


Cardiovascular: Irregular Rhythm


Abdomen: bowel sounds present, soft, no tenderness, no distension


 (Male) Exam: Deferred


Back Exam: Normal Inspection


Extremities: no calf tenderness, edema


Skin: warm, dry, intact, ecchymosis


Wound/Incisions: healing well, other (foot ulcer necrosis in bed of ulcer)


Neurological: no new focal deficit


Psy/Mental Status: alert, normal affect, normal mood





- Problem List & Annotations


(1) Anemia


SNOMED Code(s): 457543610


   Code(s): D64.9 - ANEMIA, UNSPECIFIED   Status: Acute   Priority: Medium   

Current Visit: Yes   Onset Date: ~05/08/17   


Qualifiers: 


   Anemia type: unspecified type   Qualified Code(s): D64.9 - Anemia, 

unspecified   


Annotation/Comment:: Note mild anemia today with current Coumadin therapy.  

Observe for now.  Hemoccult during this hospitalization.  Further workup 

depending on his clinical course   





(2) CHF (congestive heart failure)


SNOMED Code(s): 47444666


   Code(s): I50.9 - HEART FAILURE, UNSPECIFIED   Status: Acute   Priority: High

   Current Visit: Yes   Onset Date: 05/08/17   


Qualifiers: 


   Congestive heart failure type: unspecified congestive heart failure type   

Congestive heart failure chronicity: acute on chronic   Qualified Code(s): 

I50.9 - Heart failure, unspecified   


Annotation/Comment:: Initiate IV Lasix therapy.  Note that patient is no code 

with no further workup, including echocardiogram, etc..  No recent chest pain 

or anginal-type symptoms.  Initiate standard rule out MI orders, however.  Note 

status post CABG therapy   





(3) COPD (chronic obstructive pulmonary disease)


SNOMED Code(s): 15084848


   Code(s): J44.9 - CHRONIC OBSTRUCTIVE PULMONARY DISEASE, UNSPECIFIED   Status

: Acute   Priority: Medium   Current Visit: Yes   


Qualifiers: 


   COPD type: COPD with acute lower respiratory infection   Qualified Code(s): 

J44.0 - Chronic obstructive pulmonary disease with acute lower respiratory 

infection   


Annotation/Comment:: Theophylline level with next set of blood work.  Otherwise 

continue aggressive antibiotic and nebulizer therapy   





(4) PE, Pulmonary embolism


SNOMED Code(s): 05676453


   Code(s): I26.99 - OTHER PULMONARY EMBOLISM WITHOUT ACUTE COR PULMONALE   

Status: Acute   Priority: High   Current Visit: Yes   Onset Date: 09/30/15   

Annotation/Comment:: Note previous bilateral PEs on 9/30/15 with new left 

lingual PE today by CT scan as above.  Patient is already on Coumadin therapy 

with therapeutic INR.  No further additional therapy at this time   





(5) Pneumonia


SNOMED Code(s): 082159234


   Code(s): J18.9 - PNEUMONIA, UNSPECIFIED ORGANISM   Status: Acute   Priority: 

High   Current Visit: Yes   Onset Date: ~05/08/17   


Qualifiers: 


   Pneumonia type: due to unspecified organism   Laterality: bilateral   Lung 

location: lower lobe of lung   Qualified Code(s): J18.9 - Pneumonia, 

unspecified organism   


Annotation/Comment:: [facility] [place] [facility] 

[facility] unknown type.  [facility] consultation with [facility] 

[place] with no beds available in their facility.  [facility] assumes care in the a.m.  

Blood cultures x2 were collected in the emergency room.  Attempt to obtain 

sputum specimen for culture and sensitivity.  Influenza screen negative today.  

IV Rocephin therapy initiated in the emergency room.  The patient was 

successfully tapered to O2 therapy at 5 L per minute by nasal cannula prior to 

admission   





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


SNOMED Code(s): 110395322797487


   Code(s): E11.621 - TYPE 2 DIABETES MELLITUS WITH FOOT ULCER; L97.409 - NON-

PRS CHRONIC ULCER OF UNSP HEEL AND MIDFOOT W UNSP SEVERT   Status: Acute   

Priority: High   Current Visit: Yes   





- Problem List Review


Problem List Initiated/Reviewed/Updated: Yes





- My Orders


Last 24 Hours: 


My Active Orders





05/12/17 08:00


Azithromycin [Zithromax]   500 mg PO DAILY 





05/12/17 10:00


cefTAZidime [Fortaz]   1 gm IVPUSH Q12H 





05/12/17 11:25


Ready for Discharge [RC] PER UNIT ROUTINE 





05/12/17 11:28


Discontinue Saline Lock [Peripheral IV Discontinue] [OM.PC] Routine 














- Plan


Plan:: 





05/09/17


Jorge A Guillaume MD


Feels better today. He does not want to be transferred to [facility] today. 

He also has me talk to his wife and she agrees to him staying in [place] at 

[facility]. Continue medical plan.





05/10/17


Jorge A Guillaume MD


Continues to feel better. He does not want transfer to [facility]. 

Neuropathic ulcer foot secondary to type 2 DM noted. Surgical consult for 

possible debridement. PICC discussed due to difficult IV access and need for 

prolonged antibiotics.





05/11/17 (late entry)


Jorge A Guillaume MD


He feels better. Dr. Arora evaluated ulcer and recommended debridement but Rey 

refused recommendation. Refused to allow Dr. Arora to debride ulcer. He says he 

will follow up at [facility] for ulcer care. Continue IV antibiotics.





05/12/17


Jorge A Guillaume MD


Okay. Ready to return to [facility] with PICC on IV antibiotics. C&S noted and 

sensitive to Fortaz.

## 2017-09-20 NOTE — PCM.PN
- General Info


Date of Service: 05/09/17


Functional Status: Reports: pain controlled





- Review of Systems


General: Reports: No Symptoms


HEENT: Reports: no symptoms


Pulmonary: Reports: shortness of breath, cough


Cardiovascular: Reports: No Symptoms


Gastrointestinal: Reports: No symptoms


Genitourinary: Reports: no symptoms


Musculoskeletal: Reports: no symptoms


Skin: Reports: no symptoms, other (ulcer left foot)


Neurological: Reports: Numbness (feet), Pre-Existing Deficit, Difficulty Walking


Psychiatric: Reports: no symptoms





- Patient Data


Vitals - most recent: 


 Last Vital Signs











Temp  97.0 F   05/09/17 16:00


 


Pulse  64   05/09/17 16:00


 


Resp  14   05/09/17 16:00


 


BP  111/46 L  05/09/17 16:00


 


Pulse Ox  93 L  05/09/17 16:00











Weight - most recent: 303 lb 15.997 oz


I&O - last 24 hours: 


 Intake & Output











 05/09/17 05/09/17 05/09/17





 06:59 14:59 22:59


 


Intake Total 400 240 350


 


Output Total 600 1200 


 


Balance -200 -960 350











Lab Results last 24 hrs: 


 Laboratory Results - last 24 hr











  05/08/17 05/08/17 05/08/17 Range/Units





  18:00 19:52 22:45 


 


WBC     (4.0-10.2)  K/uL


 


RBC     (4.33-5.41)  M/uL


 


Hgb     (13.1-16.8)  g/dL


 


Hct     (39.0-49.0)  %


 


MCV     (84.0-98.0)  fL


 


MCH     (28.2-33.3)  pg


 


MCHC     (31.7-36.0)  g/dL


 


RDW     (11.2-14.1)  %


 


Plt Count     (150-350)  K/uL


 


Neut % (Auto)     (45.0-80.0)  %


 


Lymph % (Auto)     (10.0-50.0)  %


 


Mono % (Auto)     (2.0-14.0)  %


 


Eos % (Auto)     (0.0-5.0)  %


 


Baso % (Auto)     (0.0-2.0)  %


 


Neut # (Auto)     (1.40-7.00)  K/uL


 


Lymph # (Auto)     (0.50-3.50)  K/uL


 


Mono # (Auto)     (0.00-1.00)  K/uL


 


Eos # (Auto)     (0.00-0.50)  K/uL


 


Baso # (Auto)     (0.00-0.20)  K/uL


 


PT     (9.8-11.7)  SEC


 


INR     


 


D-Dimer, Quantitative     (0-400)  ng/mL


 


Sodium     (136-145)  mmol/L


 


Potassium     (3.5-5.1)  mmol/L


 


Chloride     ()  mmol/L


 


Carbon Dioxide     (21.0-32.0)  mmol/L


 


BUN     (7-18)  mg/dL


 


Creatinine     (0.51-1.17)  mg/dL


 


Est Cr Clr Drug Dosing     mL/min


 


Estimated GFR (MDRD)     mL/min


 


Glucose     ()  mg/dL


 


POC Glucose   268 H*   ()  mg/dl


 


Hemoglobin A1c     (4.3-5.7)  %


 


Calcium     (8.5-10.1)  mg/dL


 


Total Bilirubin     (0.2-1.0)  mg/dL


 


AST     (15-37)  U/L


 


ALT     (12-78)  U/L


 


Alkaline Phosphatase     ()  IU/L


 


Creatine Kinase    67  ()  U/L


 


Creatine Kinase Index    2.7 H  (0.0-2.5)  %


 


CK-MB (CK-2)    1.80  (0.00-3.60)  ng/mL


 


Troponin I    0.006  (0.000-0.056)  ng/mL


 


C-Reactive Protein     (<=0.9)  mg/dL


 


Pro-B-Natriuretic Pept     (0-125)  pg/mL


 


Total Protein     (6.4-8.2)  g/dL


 


Albumin     (3.4-5.0)  g/dL


 


Triglycerides     ()  mg/dL


 


Cholesterol     (100-200)  mg/dL


 


LDL Cholesterol, Calc     (0-100)  mg/dL


 


HDL Cholesterol     (40-60)  mg/dL


 


Specimen Type  Urincc    


 


Urine Color  Yellow    


 


Urine Appearance  Clear    


 


Urine pH  5.0    (5.0-9.0)  


 


Ur Specific Gravity  1.015    (1.005-1.030)  


 


Urine Protein  Negative    (NEGATIVE)  mg/dL


 


Urine Glucose (UA)  Negative    (NEGATIVE)  mg/dL


 


Urine Ketones  Negative    (NEGATIVE)  mg/dL


 


Urine Occult Blood  Moderate H    (NEGATIVE)  


 


Urine Nitrite  Negative    (NEGATIVE)  


 


Urine Bilirubin  Negative    (NEGATIVE)  


 


Urine Urobilinogen  0.2    (0.2-1.0)  E.U./dL


 


Ur Leukocyte Esterase  Negative    (NEGATIVE)  


 


Urine RBC  5-10 H    /HPF


 


Urine WBC  0-5    /HPF


 


Urine Bacteria  Rare    (NONE TO FEW)  /HPF














  05/09/17 05/09/17 05/09/17 Range/Units





  06:40 06:40 06:40 


 


WBC   9.9   (4.0-10.2)  K/uL


 


RBC   3.68 L   (4.33-5.41)  M/uL


 


Hgb   10.2 L   (13.1-16.8)  g/dL


 


Hct   35.3 L   (39.0-49.0)  %


 


MCV   95.9   (84.0-98.0)  fL


 


MCH   27.7 L   (28.2-33.3)  pg


 


MCHC   28.9 L   (31.7-36.0)  g/dL


 


RDW   15.3 H   (11.2-14.1)  %


 


Plt Count   241   (150-350)  K/uL


 


Neut % (Auto)   66.2   (45.0-80.0)  %


 


Lymph % (Auto)   20.6   (10.0-50.0)  %


 


Mono % (Auto)   8.8   (2.0-14.0)  %


 


Eos % (Auto)   4.3   (0.0-5.0)  %


 


Baso % (Auto)   0.1   (0.0-2.0)  %


 


Neut # (Auto)   6.55   (1.40-7.00)  K/uL


 


Lymph # (Auto)   2.04   (0.50-3.50)  K/uL


 


Mono # (Auto)   0.87   (0.00-1.00)  K/uL


 


Eos # (Auto)   0.43   (0.00-0.50)  K/uL


 


Baso # (Auto)   0.01   (0.00-0.20)  K/uL


 


PT  35.8 H    (9.8-11.7)  SEC


 


INR  3.2    


 


D-Dimer, Quantitative    389  (0-400)  ng/mL


 


Sodium     (136-145)  mmol/L


 


Potassium     (3.5-5.1)  mmol/L


 


Chloride     ()  mmol/L


 


Carbon Dioxide     (21.0-32.0)  mmol/L


 


BUN     (7-18)  mg/dL


 


Creatinine     (0.51-1.17)  mg/dL


 


Est Cr Clr Drug Dosing     mL/min


 


Estimated GFR (MDRD)     mL/min


 


Glucose     ()  mg/dL


 


POC Glucose     ()  mg/dl


 


Hemoglobin A1c     (4.3-5.7)  %


 


Calcium     (8.5-10.1)  mg/dL


 


Total Bilirubin     (0.2-1.0)  mg/dL


 


AST     (15-37)  U/L


 


ALT     (12-78)  U/L


 


Alkaline Phosphatase     ()  IU/L


 


Creatine Kinase     ()  U/L


 


Creatine Kinase Index     (0.0-2.5)  %


 


CK-MB (CK-2)     (0.00-3.60)  ng/mL


 


Troponin I     (0.000-0.056)  ng/mL


 


C-Reactive Protein     (<=0.9)  mg/dL


 


Pro-B-Natriuretic Pept     (0-125)  pg/mL


 


Total Protein     (6.4-8.2)  g/dL


 


Albumin     (3.4-5.0)  g/dL


 


Triglycerides     ()  mg/dL


 


Cholesterol     (100-200)  mg/dL


 


LDL Cholesterol, Calc     (0-100)  mg/dL


 


HDL Cholesterol     (40-60)  mg/dL


 


Specimen Type     


 


Urine Color     


 


Urine Appearance     


 


Urine pH     (5.0-9.0)  


 


Ur Specific Gravity     (1.005-1.030)  


 


Urine Protein     (NEGATIVE)  mg/dL


 


Urine Glucose (UA)     (NEGATIVE)  mg/dL


 


Urine Ketones     (NEGATIVE)  mg/dL


 


Urine Occult Blood     (NEGATIVE)  


 


Urine Nitrite     (NEGATIVE)  


 


Urine Bilirubin     (NEGATIVE)  


 


Urine Urobilinogen     (0.2-1.0)  E.U./dL


 


Ur Leukocyte Esterase     (NEGATIVE)  


 


Urine RBC     /HPF


 


Urine WBC     /HPF


 


Urine Bacteria     (NONE TO FEW)  /HPF














  05/09/17 05/09/17 05/09/17 Range/Units





  06:40 06:50 08:06 


 


WBC     (4.0-10.2)  K/uL


 


RBC     (4.33-5.41)  M/uL


 


Hgb     (13.1-16.8)  g/dL


 


Hct     (39.0-49.0)  %


 


MCV     (84.0-98.0)  fL


 


MCH     (28.2-33.3)  pg


 


MCHC     (31.7-36.0)  g/dL


 


RDW     (11.2-14.1)  %


 


Plt Count     (150-350)  K/uL


 


Neut % (Auto)     (45.0-80.0)  %


 


Lymph % (Auto)     (10.0-50.0)  %


 


Mono % (Auto)     (2.0-14.0)  %


 


Eos % (Auto)     (0.0-5.0)  %


 


Baso % (Auto)     (0.0-2.0)  %


 


Neut # (Auto)     (1.40-7.00)  K/uL


 


Lymph # (Auto)     (0.50-3.50)  K/uL


 


Mono # (Auto)     (0.00-1.00)  K/uL


 


Eos # (Auto)     (0.00-0.50)  K/uL


 


Baso # (Auto)     (0.00-0.20)  K/uL


 


PT     (9.8-11.7)  SEC


 


INR     


 


D-Dimer, Quantitative     (0-400)  ng/mL


 


Sodium  143    (136-145)  mmol/L


 


Potassium  3.8    (3.5-5.1)  mmol/L


 


Chloride  103    ()  mmol/L


 


Carbon Dioxide  34.9 H    (21.0-32.0)  mmol/L


 


BUN  16    (7-18)  mg/dL


 


Creatinine  0.93    (0.51-1.17)  mg/dL


 


Est Cr Clr Drug Dosing  90.48    mL/min


 


Estimated GFR (MDRD)  > 60    mL/min


 


Glucose  102    ()  mg/dL


 


POC Glucose    107  ()  mg/dl


 


Hemoglobin A1c   6.2 H   (4.3-5.7)  %


 


Calcium  8.6    (8.5-10.1)  mg/dL


 


Total Bilirubin  0.3    (0.2-1.0)  mg/dL


 


AST  23    (15-37)  U/L


 


ALT  19    (12-78)  U/L


 


Alkaline Phosphatase  95    ()  IU/L


 


Creatine Kinase  66    ()  U/L


 


Creatine Kinase Index  2.1    (0.0-2.5)  %


 


CK-MB (CK-2)  1.40    (0.00-3.60)  ng/mL


 


Troponin I  0.007    (0.000-0.056)  ng/mL


 


C-Reactive Protein  16.4 H    (<=0.9)  mg/dL


 


Pro-B-Natriuretic Pept  2505 H    (0-125)  pg/mL


 


Total Protein  6.6    (6.4-8.2)  g/dL


 


Albumin  2.3 L    (3.4-5.0)  g/dL


 


Triglycerides  68    ()  mg/dL


 


Cholesterol  93 L    (100-200)  mg/dL


 


LDL Cholesterol, Calc  35    (0-100)  mg/dL


 


HDL Cholesterol  44    (40-60)  mg/dL


 


Specimen Type     


 


Urine Color     


 


Urine Appearance     


 


Urine pH     (5.0-9.0)  


 


Ur Specific Gravity     (1.005-1.030)  


 


Urine Protein     (NEGATIVE)  mg/dL


 


Urine Glucose (UA)     (NEGATIVE)  mg/dL


 


Urine Ketones     (NEGATIVE)  mg/dL


 


Urine Occult Blood     (NEGATIVE)  


 


Urine Nitrite     (NEGATIVE)  


 


Urine Bilirubin     (NEGATIVE)  


 


Urine Urobilinogen     (0.2-1.0)  E.U./dL


 


Ur Leukocyte Esterase     (NEGATIVE)  


 


Urine RBC     /HPF


 


Urine WBC     /HPF


 


Urine Bacteria     (NONE TO FEW)  /HPF














  05/09/17 05/09/17 Range/Units





  11:07 17:39 


 


WBC    (4.0-10.2)  K/uL


 


RBC    (4.33-5.41)  M/uL


 


Hgb    (13.1-16.8)  g/dL


 


Hct    (39.0-49.0)  %


 


MCV    (84.0-98.0)  fL


 


MCH    (28.2-33.3)  pg


 


MCHC    (31.7-36.0)  g/dL


 


RDW    (11.2-14.1)  %


 


Plt Count    (150-350)  K/uL


 


Neut % (Auto)    (45.0-80.0)  %


 


Lymph % (Auto)    (10.0-50.0)  %


 


Mono % (Auto)    (2.0-14.0)  %


 


Eos % (Auto)    (0.0-5.0)  %


 


Baso % (Auto)    (0.0-2.0)  %


 


Neut # (Auto)    (1.40-7.00)  K/uL


 


Lymph # (Auto)    (0.50-3.50)  K/uL


 


Mono # (Auto)    (0.00-1.00)  K/uL


 


Eos # (Auto)    (0.00-0.50)  K/uL


 


Baso # (Auto)    (0.00-0.20)  K/uL


 


PT    (9.8-11.7)  SEC


 


INR    


 


D-Dimer, Quantitative    (0-400)  ng/mL


 


Sodium    (136-145)  mmol/L


 


Potassium    (3.5-5.1)  mmol/L


 


Chloride    ()  mmol/L


 


Carbon Dioxide    (21.0-32.0)  mmol/L


 


BUN    (7-18)  mg/dL


 


Creatinine    (0.51-1.17)  mg/dL


 


Est Cr Clr Drug Dosing    mL/min


 


Estimated GFR (MDRD)    mL/min


 


Glucose    ()  mg/dL


 


POC Glucose  209 H  139 H  ()  mg/dl


 


Hemoglobin A1c    (4.3-5.7)  %


 


Calcium    (8.5-10.1)  mg/dL


 


Total Bilirubin    (0.2-1.0)  mg/dL


 


AST    (15-37)  U/L


 


ALT    (12-78)  U/L


 


Alkaline Phosphatase    ()  IU/L


 


Creatine Kinase    ()  U/L


 


Creatine Kinase Index    (0.0-2.5)  %


 


CK-MB (CK-2)    (0.00-3.60)  ng/mL


 


Troponin I    (0.000-0.056)  ng/mL


 


C-Reactive Protein    (<=0.9)  mg/dL


 


Pro-B-Natriuretic Pept    (0-125)  pg/mL


 


Total Protein    (6.4-8.2)  g/dL


 


Albumin    (3.4-5.0)  g/dL


 


Triglycerides    ()  mg/dL


 


Cholesterol    (100-200)  mg/dL


 


LDL Cholesterol, Calc    (0-100)  mg/dL


 


HDL Cholesterol    (40-60)  mg/dL


 


Specimen Type    


 


Urine Color    


 


Urine Appearance    


 


Urine pH    (5.0-9.0)  


 


Ur Specific Gravity    (1.005-1.030)  


 


Urine Protein    (NEGATIVE)  mg/dL


 


Urine Glucose (UA)    (NEGATIVE)  mg/dL


 


Urine Ketones    (NEGATIVE)  mg/dL


 


Urine Occult Blood    (NEGATIVE)  


 


Urine Nitrite    (NEGATIVE)  


 


Urine Bilirubin    (NEGATIVE)  


 


Urine Urobilinogen    (0.2-1.0)  E.U./dL


 


Ur Leukocyte Esterase    (NEGATIVE)  


 


Urine RBC    /HPF


 


Urine WBC    /HPF


 


Urine Bacteria    (NONE TO FEW)  /HPF











Maximilian Results last 24 hrs: 


 Microbiology











 05/09/17 12:20 Gram Stain - Final





 Sputum - Expectorated 


 


 05/08/17 14:08 Stool Occult Blood (MAXIMILIAN) - Final





 Stool / Feces    NEGATIVE OCCULT BLOOD











Med Orders - Current: 


 Current Medications





Acetaminophen (Tylenol)  650 mg PO Q4H PRN


   PRN Reason: Fever


Hydrocodone Bitart/Acetaminophen (Norco 325-5 Mg)  1 tab PO DAILY PRN


   PRN Reason: Pain


Hydrocodone Bitart/Acetaminophen (Norco 325-5 Mg)  2 tab PO BID@0800,1900 [facility]


   Last Admin: 05/09/17 08:33 Dose:  2 tab


Albuterol (Proventil Neb Soln)  2.5 mg INH Q2H PRN


   PRN Reason: SHORTNESS OF BREATH


Albuterol/Ipratropium (Duoneb 3.0-0.5 Mg/3 Ml)  3 ml NEB Q4HRRT PRN


   PRN Reason: Dyspnea


Albuterol/Ipratropium (Duoneb 3.0-0.5 Mg/3 Ml)  3 ml NEB Q6HRRT [facility]


   Last Admin: 05/09/17 14:55 Dose:  3 ml


Bisacodyl (Dulcolax)  10 mg RECTAL DAILY PRN


   PRN Reason: Constipation


Budesonide (Pulmicort)  0.5 mg NEB BIDRT [facility]


   Last Admin: 05/09/17 08:34 Dose:  0.5 mg


Escitalopram Oxalate (Lexapro)  10 mg PO DAILY@0800 [facility]


   Last Admin: 05/09/17 08:32 Dose:  10 mg


Furosemide (Lasix)  40 mg IVPUSH DAILY [facility]


Gabapentin (Neurontin)  900 mg PO TID@1000,1500,1900 [facility]


   Last Admin: 05/09/17 14:55 Dose:  900 mg


Guaifenesin/Dextromethorphan (Mucinex Dm Er 600-30 Mg)  1 tab PO BID [facility]


   Last Admin: 05/09/17 17:41 Dose:  1 tab


Azithromycin 500 mg/ Sodium (Chloride)  250 mls @ 250 mls/hr IV Q24H [facility]


   Last Admin: 05/09/17 14:56 Dose:  250 mls/hr


Ceftriaxone Sodium 1 gm/ (Sodium Chloride)  100 mls @ 200 mls/hr IV Q12H [facility]


   Last Admin: 05/09/17 10:52 Dose:  200 mls/hr


Insulin Aspart (Novolog)  0 unit SUBCUT ACBED [facility]


   PRN Reason: Protocol


   Last Admin: 05/09/17 17:39 Dose:  Not Given


Insulin Detemir (Levemir)  18 unit SUBCUT DAILY@1000 [facility]


   Last Admin: 05/09/17 10:51 Dose:  18 units


Latanoprost (Xalatan 0.005% Ophth Soln)  0 ml EYEBOTH DAILY@1900 [facility]


   Last Admin: 05/08/17 20:10 Dose:  1 drop


Lorazepam (Ativan)  0.25 mg PO BID PRN


   PRN Reason: Anxiety


Lorazepam (Ativan)  0.5 mg PO TID@1000,1500,1900 [facility]


   Last Admin: 05/09/17 14:55 Dose:  0.5 mg


Magnesium Hydroxide (Milk Of Magnesia)  30 ml PO DAILY PRN


   PRN Reason: Constipation


Magnesium Oxide (Magnesium Oxide)  400 mg PO DAILY [facility]


   Last Admin: 05/09/17 08:33 Dose:  400 mg


Methadone HCl (Methadone)  10 mg PO BID@1000,1900 [facility]


   Last Admin: 05/09/17 10:48 Dose:  10 mg


Methyl Salicylate (Icy Hot Cream)  1 gm TOP BID PRN


   PRN Reason: Pain


Ipratropium Nasal (Spary 0.06%)  1 inh JOVITA DAILY@10 [facility]


   Last Admin: 05/09/17 11:06 Dose:  1 inh


Phenylephrine Hcl [ (Sudogest Pe] 10 Mg))  10 mg PO Q4HR PRN


   PRN Reason: Congestion


Polyethylene Glycol (Miralax)  17 gm PO DAILY@1500 [facility]


   Last Admin: 05/09/17 14:55 Dose:  17 gm


Potassium Chloride (Klor-Con M20)  40 meq PO TID [facility]


   Last Admin: 05/09/17 17:40 Dose:  40 meq


Quetiapine Fumarate (Seroquel)  25 mg PO TID@0800,1200,1900 [facility]


   Last Admin: 05/09/17 11:06 Dose:  25 mg


Saliva Substitute (Walt-Stir Oral Spray)  0 ml MUCMEM BID PRN


   PRN Reason: Dryness


Senna/Docusate Sodium (Senna Plus)  2 tab PO BID@1000,1900 [facility]


   Last Admin: 05/09/17 10:47 Dose:  2 tab


Simvastatin (Zocor)  10 mg PO DAILY@1900 [facility]


   Last Admin: 05/08/17 19:51 Dose:  10 mg


Sodium Chloride (Saline Flush)  10 ml FLUSH ASDIRECTED PRN


   PRN Reason: Keep Vein Open


   Last Admin: 05/09/17 10:53 Dose:  10 ml


Sodium Chloride (Ocean Nasal Spray)  0 ml JOVITA ASDIRECTED PRN


   PRN Reason: Dryness


Sodium Chloride (Saline Flush)  10 ml FLUSH Q12HR PRN


   PRN Reason: Keep Vein Open


   Last Admin: 05/09/17 08:31 Dose:  10 ml


Sodium Chloride (Saline Flush)  10 ml FLUSH Q12H PRN


   PRN Reason: Keep Vein Open


Sotalol HCl (Betapace)  40 mg PO BID@1000,1900 [facility]


   Last Admin: 05/09/17 11:03 Dose:  40 mg


Theophylline (Theophylline Anhydrous)  300 mg PO BID@1000,1900 [facility]


   Last Admin: 05/09/17 10:48 Dose:  300 mg


Triamcinolone Acetonide (Triamcinolone Acetonide 0.1% Crm)  0 gm TOP BID PRN


   PRN Reason: Itching





Discontinued Medications





Acetaminophen (Tylenol)  650 mg PO Q4H PRN


   PRN Reason: Pain (Mild 1-3)/fever


Albuterol/Ipratropium (Duoneb 3.0-0.5 Mg/3 Ml)  3 ml NEB Q6HRRT [facility]


Famotidine (Pepcid)  40 mg IVPUSH ONETIME ONE


   Stop: 05/08/17 09:56


   Last Admin: 05/08/17 10:31 Dose:  40 mg


Furosemide (Lasix)  40 mg IVPUSH Q8H [facility]


   Last Admin: 05/09/17 14:56 Dose:  40 mg


Ceftriaxone Sodium 1 gm/ (Sodium Chloride)  100 mls @ 200 mls/hr IV ONETIME ONE


   Stop: 05/08/17 11:22


   Last Admin: 05/08/17 11:15 Dose:  200 mls/hr


Iopamidol (Isovue-370 (76%))  100 ml IVPUSH ONETIME ONE


   Stop: 05/08/17 11:14


   Last Admin: 05/08/17 12:17 Dose:  100 ml


Potassium Chloride (Klor-Con M20)  60 meq PO TID [facility]


   Last Admin: 05/09/17 11:03 Dose:  60 meq











- Exam


Quality Assessment: supplemental oxygen


General: alert, cooperative, no acute distress


HEENT: Pupils equal, Pupils reactive, Mucous membr. moist/pink


Neck: trachea midline, no JVD


Lungs: Normal respiratory effort, Decreased breath sounds, Rhonchi


Cardiovascular: Irregular Rhythm


Abdomen: bowel sounds present, soft, no tenderness, no distension


 (Male) Exam: Deferred


Back Exam: normal inspection


Extremities: no calf tenderness, edema


Skin: warm, dry, intact, ecchymosis


Neurological: no new focal deficit


Psy/Mental Status: alert, normal affect, normal mood





- Problem List & Annotations


(1) Anemia


SNOMED Code(s): 306450039


   Code(s): D64.9 - ANEMIA, UNSPECIFIED   Status: Acute   Priority: Medium   

Current Visit: Yes   Onset Date: ~05/08/17   


Qualifiers: 


   Anemia type: unspecified type   Qualified Code(s): D64.9 - Anemia, 

unspecified   


Annotation/Comment:: Note mild anemia today with current Coumadin therapy.  

Observe for now.  Hemoccult during this hospitalization.  Further workup 

depending on his clinical course   





(2) CHF (congestive heart failure)


SNOMED Code(s): 62249628


   Code(s): I50.9 - HEART FAILURE, UNSPECIFIED   Status: Acute   Priority: High

   Current Visit: Yes   Onset Date: 05/08/17   


Qualifiers: 


   Congestive heart failure type: unspecified congestive heart failure type   

Congestive heart failure chronicity: acute on chronic   Qualified Code(s): 

I50.9 - Heart failure, unspecified   


Annotation/Comment:: Initiate IV Lasix therapy.  Note that patient is no code 

with no further workup, including echocardiogram, etc..  No recent chest pain 

or anginal-type symptoms.  Initiate standard rule out MI orders, however.  Note 

status post CABG therapy   





(3) COPD (chronic obstructive pulmonary disease)


SNOMED Code(s): 31344334


   Code(s): J44.9 - CHRONIC OBSTRUCTIVE PULMONARY DISEASE, UNSPECIFIED   Status

: Acute   Priority: Medium   Current Visit: Yes   


Qualifiers: 


   COPD type: COPD with acute lower respiratory infection   Qualified Code(s): 

J44.0 - Chronic obstructive pulmonary disease with acute lower respiratory 

infection   


Annotation/Comment:: Theophylline level with next set of blood work.  Otherwise 

continue aggressive antibiotic and nebulizer therapy   





(4) PE, Pulmonary embolism


SNOMED Code(s): 90548717


   Code(s): I26.99 - OTHER PULMONARY EMBOLISM WITHOUT ACUTE COR PULMONALE   

Status: Acute   Priority: High   Current Visit: Yes   Onset Date: 09/30/15   

Annotation/Comment:: Note previous bilateral PEs on 9/30/15 with new left 

lingual PE today by CT scan as above.  Patient is already on Coumadin therapy 

with therapeutic INR.  No further additional therapy at this time   





(5) Pneumonia


SNOMED Code(s): 141256219


   Code(s): J18.9 - PNEUMONIA, UNSPECIFIED ORGANISM   Status: Acute   Priority: 

High   Current Visit: Yes   Onset Date: ~05/08/17   


Qualifiers: 


   Pneumonia type: due to unspecified organism   Laterality: bilateral   Lung 

location: lower lobe of lung   Qualified Code(s): J18.9 - Pneumonia, 

unspecified organism   


Annotation/Comment:: Note recent hospitalization at the [facility] for 

pneumonia of unknown type.  Telephone consultation with the [facility] with no beds available in their facility.  [facility] [place] [facility] assumes care in the a.m.  

Blood cultures x2 were collected in the emergency room.  Attempt to obtain 

sputum specimen for culture and sensitivity.  Influenza screen negative today.  

IV Rocephin therapy initiated in the emergency room.  The patient was 

successfully tapered to O2 therapy at 5 L per minute by nasal cannula prior to 

admission   





- Problem List Review


Problem List Initiated/Reviewed/Updated: Yes





- My Orders


Last 24 Hours: 


My Active Orders





05/09/17 16:08


Consult to Speech Language Pathology [SLP Evaluation and Treatment] [CONS] 

Routine 





05/09/17 16:13


Consult to Physician [CONS] Routine 





05/09/17 16:18


Notify Provider Consults [RC] ASDIRECTED 





05/09/17 18:00


Potassium Chloride [Klor-Con M20]   40 meq PO TID 





05/10/17 08:00


Furosemide [Lasix]   40 mg IVPUSH DAILY 





05/11/17 09:00


Swallowing Function w Video [CR] Routine 














- Plan


Plan:: 





05/09/17


Jorge A Guillaume MD


Feels better today. He does not want to be transferred to [facility] today. 

He also has me talk to his wife and she agrees to him staying in [place] at 

hospital. Continue medical plan. 83

## 2018-09-03 ENCOUNTER — HOSPITAL ENCOUNTER (INPATIENT)
Dept: HOSPITAL 52 - LL.ED | Age: 73
LOS: 2 days | DRG: 871 | End: 2018-09-05
Attending: FAMILY MEDICINE | Admitting: FAMILY MEDICINE
Payer: MEDICARE

## 2018-09-03 DIAGNOSIS — H91.13: ICD-10-CM

## 2018-09-03 DIAGNOSIS — I25.10: ICD-10-CM

## 2018-09-03 DIAGNOSIS — Z66: ICD-10-CM

## 2018-09-03 DIAGNOSIS — E11.42: ICD-10-CM

## 2018-09-03 DIAGNOSIS — F41.9: ICD-10-CM

## 2018-09-03 DIAGNOSIS — Z86.718: ICD-10-CM

## 2018-09-03 DIAGNOSIS — G89.4: ICD-10-CM

## 2018-09-03 DIAGNOSIS — I83.90: ICD-10-CM

## 2018-09-03 DIAGNOSIS — Z51.5: ICD-10-CM

## 2018-09-03 DIAGNOSIS — H40.9: ICD-10-CM

## 2018-09-03 DIAGNOSIS — K59.09: ICD-10-CM

## 2018-09-03 DIAGNOSIS — Z79.01: ICD-10-CM

## 2018-09-03 DIAGNOSIS — Z89.432: ICD-10-CM

## 2018-09-03 DIAGNOSIS — E11.51: ICD-10-CM

## 2018-09-03 DIAGNOSIS — J96.91: ICD-10-CM

## 2018-09-03 DIAGNOSIS — Z79.82: ICD-10-CM

## 2018-09-03 DIAGNOSIS — Z91.030: ICD-10-CM

## 2018-09-03 DIAGNOSIS — M15.0: ICD-10-CM

## 2018-09-03 DIAGNOSIS — Z95.1: ICD-10-CM

## 2018-09-03 DIAGNOSIS — I50.9: ICD-10-CM

## 2018-09-03 DIAGNOSIS — G47.30: ICD-10-CM

## 2018-09-03 DIAGNOSIS — I45.2: ICD-10-CM

## 2018-09-03 DIAGNOSIS — E11.622: ICD-10-CM

## 2018-09-03 DIAGNOSIS — E11.21: ICD-10-CM

## 2018-09-03 DIAGNOSIS — E87.2: ICD-10-CM

## 2018-09-03 DIAGNOSIS — H35.30: ICD-10-CM

## 2018-09-03 DIAGNOSIS — M19.90: ICD-10-CM

## 2018-09-03 DIAGNOSIS — E66.9: ICD-10-CM

## 2018-09-03 DIAGNOSIS — R09.02: ICD-10-CM

## 2018-09-03 DIAGNOSIS — F32.9: ICD-10-CM

## 2018-09-03 DIAGNOSIS — A41.9: Primary | ICD-10-CM

## 2018-09-03 DIAGNOSIS — Z86.711: ICD-10-CM

## 2018-09-03 DIAGNOSIS — J44.1: ICD-10-CM

## 2018-09-03 DIAGNOSIS — M48.061: ICD-10-CM

## 2018-09-03 DIAGNOSIS — N18.9: ICD-10-CM

## 2018-09-03 DIAGNOSIS — I13.0: ICD-10-CM

## 2018-09-03 DIAGNOSIS — I87.8: ICD-10-CM

## 2018-09-03 DIAGNOSIS — Z79.4: ICD-10-CM

## 2018-09-03 DIAGNOSIS — E88.09: ICD-10-CM

## 2018-09-03 DIAGNOSIS — Z79.899: ICD-10-CM

## 2018-09-03 DIAGNOSIS — I44.7: ICD-10-CM

## 2018-09-03 DIAGNOSIS — I26.99: ICD-10-CM

## 2018-09-03 DIAGNOSIS — E78.00: ICD-10-CM

## 2018-09-03 DIAGNOSIS — I27.20: ICD-10-CM

## 2018-09-03 DIAGNOSIS — J84.10: ICD-10-CM

## 2018-09-03 DIAGNOSIS — N39.498: ICD-10-CM

## 2018-09-03 DIAGNOSIS — L98.499: ICD-10-CM

## 2018-09-03 DIAGNOSIS — N40.1: ICD-10-CM

## 2018-09-03 DIAGNOSIS — J44.9: ICD-10-CM

## 2018-09-03 DIAGNOSIS — I48.91: ICD-10-CM

## 2018-09-03 DIAGNOSIS — H91.90: ICD-10-CM

## 2018-09-03 DIAGNOSIS — E11.22: ICD-10-CM

## 2018-09-03 LAB
BASE EXCESS BLDA CALC-SCNC: 12 MMOL/L (ref -2–3)
CHLORIDE SERPL-SCNC: 102 MMOL/L (ref 98–107)
HCO3 BLDA-SCNC: 39 MMOL/L (ref 22–26)
INHALED O2 MECHANISM DOSE: (no result)
PCO2 BLDA: 83 MMHG (ref 35–45)
PO2 BLDA: 72 MMHG (ref 80–105)
SAO2 % BLDA: 91 % (ref 95–98)
SODIUM SERPL-SCNC: 144 MMOL/L (ref 136–145)

## 2018-09-03 PROCEDURE — 87040 BLOOD CULTURE FOR BACTERIA: CPT

## 2018-09-03 PROCEDURE — 71045 X-RAY EXAM CHEST 1 VIEW: CPT

## 2018-09-03 PROCEDURE — 51702 INSERT TEMP BLADDER CATH: CPT

## 2018-09-03 PROCEDURE — 93005 ELECTROCARDIOGRAM TRACING: CPT

## 2018-09-03 PROCEDURE — 99285 EMERGENCY DEPT VISIT HI MDM: CPT

## 2018-09-03 PROCEDURE — 85025 COMPLETE CBC W/AUTO DIFF WBC: CPT

## 2018-09-03 PROCEDURE — 84484 ASSAY OF TROPONIN QUANT: CPT

## 2018-09-03 PROCEDURE — 87088 URINE BACTERIA CULTURE: CPT

## 2018-09-03 PROCEDURE — 36415 COLL VENOUS BLD VENIPUNCTURE: CPT

## 2018-09-03 PROCEDURE — 87186 SC STD MICRODIL/AGAR DIL: CPT

## 2018-09-03 PROCEDURE — 83605 ASSAY OF LACTIC ACID: CPT

## 2018-09-03 PROCEDURE — 96374 THER/PROPH/DIAG INJ IV PUSH: CPT

## 2018-09-03 PROCEDURE — 82803 BLOOD GASES ANY COMBINATION: CPT

## 2018-09-03 PROCEDURE — 85610 PROTHROMBIN TIME: CPT

## 2018-09-03 PROCEDURE — 87086 URINE CULTURE/COLONY COUNT: CPT

## 2018-09-03 PROCEDURE — 80053 COMPREHEN METABOLIC PANEL: CPT

## 2018-09-03 PROCEDURE — 83880 ASSAY OF NATRIURETIC PEPTIDE: CPT

## 2018-09-03 PROCEDURE — 96361 HYDRATE IV INFUSION ADD-ON: CPT

## 2018-09-04 LAB
BASE EXCESS BLDA CALC-SCNC: 14 MMOL/L (ref -2–3)
BASE EXCESS BLDV CALC-SCNC: 9 MMOL/L (ref ?–3)
CHLORIDE SERPL-SCNC: 104 MMOL/L (ref 98–107)
HCO3 BLDA-SCNC: 42 MMOL/L (ref 22–26)
HCO3 BLDV-SCNC: 37 MMOL/L (ref 23–28)
INHALED O2 MECHANISM DOSE: (no result)
INHALED O2 MECHANISM DOSE: (no result)
PCO2 BLDA: 94 MMHG (ref 35–45)
PCO2 BLDV: 90 MMHG (ref 41–51)
PH BLDV: 7.23 [PH] (ref 7.31–7.41)
PO2 BLDA: 69 MMHG (ref 80–105)
PO2 BLDV: 221 MMHG
SAO2 % BLDA: 89 % (ref 95–98)
SAO2 % BLDV: 100 %
SODIUM SERPL-SCNC: 145 MMOL/L (ref 136–145)

## 2018-09-04 PROCEDURE — 0BH17EZ INSERTION OF ENDOTRACHEAL AIRWAY INTO TRACHEA, VIA NATURAL OR ARTIFICIAL OPENING: ICD-10-PCS | Performed by: FAMILY MEDICINE

## 2018-09-04 PROCEDURE — 5A1935Z RESPIRATORY VENTILATION, LESS THAN 24 CONSECUTIVE HOURS: ICD-10-PCS | Performed by: FAMILY MEDICINE

## 2018-09-04 RX ADMIN — Medication PRN ML: at 00:30

## 2018-09-04 RX ADMIN — THEOPHYLLINE SCH MG: 300 TABLET, EXTENDED RELEASE ORAL at 19:41

## 2018-09-04 RX ADMIN — Medication PRN ML: at 19:47

## 2018-09-04 RX ADMIN — Medication PRN ML: at 20:50

## 2018-09-04 RX ADMIN — Medication PRN ML: at 17:59

## 2018-09-04 RX ADMIN — THEOPHYLLINE SCH MG: 300 TABLET, EXTENDED RELEASE ORAL at 10:25

## 2018-09-04 NOTE — EDM.PDOC
ED HPI GENERAL MEDICAL PROBLEM





- General


Chief Complaint: Respiratory Problem


Stated Complaint: decreased oxygenation, fever, unresponsive


Time Seen by Provider: 09/03/18 22:44


Source of Information: Reports: Nursing Home Records, RN


History Limitations: Reports: Altered Mental Status, Respiratory Distress, 

Other (Unresponsive)





- History of Present Illness


INITIAL COMMENTS - FREE TEXT/NARRATIVE: 





Patient is a 72-year-old gentleman who was transferred from the [facility] with history of hypoxia saturations in the low 70s high 60s and 

unresponsiveness at this time initial blood pressure was 70/32 his temperature 

was 102 saturations on arrival were 95% on rebreather mask 100% oxygen 

according to the nurses patient has been his usual self all day


Onset: Sudden


Duration: Minutes:, Hour(s):, Constant


Location: Reports: Chest, Generalized


Severity: Severe


Improves with: Reports: None


Worsens with: Reports: None


Context: Reports: Sick Contact





- Related Data


 Allergies











Allergy/AdvReac Type Severity Reaction Status Date / Time


 


bee venom protein (honey bee) Allergy  Cannot Verified 09/03/18 22:44





   Remember  











Home Meds: 


 Home Meds





Acetaminophen 2 tab PO Q6H PRN 09/30/15 [History]


Bisacodyl [Dulcolax] 10 mg RECTAL DAILY PRN 09/30/15 [History]


Gabapentin [Neurontin] 900 mg PO TID@1000,1500,1900 09/30/15 [History]


Hydrocortisone [Anusol-HC] 30 gm RC BID PRN 09/30/15 [History]


Insulin Detemir [Levemir] 18 unit SQ DAILY@1000 09/30/15 [History]


LORazepam 0.5 mg PO TID@1000,1500,1900 09/30/15 [History]


Methadone 10 mg PO BID@1000,1900 09/30/15 [History]


QUEtiapine Fumarate [Quetiapine Fumarate] 25 mg PO TID@0800,1200,1900 09/30/15 [

History]


Sennosides/Docusate Sodium [DOK Plus] 2 each PO BID@1000,1900 09/30/15 [History]


Torsemide 20 mg PO DAILY@1000 09/30/15 [History]


Triamcinolone Acetonide [Triamcinolone Acetonide 0.1% Crm] 15 gm TOP BID PRN 09/

30/15 [History]


Warfarin [Coumadin] 5 mg PO SUTUTHSA@1900 09/30/15 [History]


Albuterol/Ipratropium [DuoNeb 3.0-0.5 MG/3 ML] 1 vial INH BID@1100,1900 10/01/

15 [History]


Theophylline [Theophylline Anhydrous] 300 mg PO BID@1000,1900 10/01/15 [History]


Albuterol/Ipratropium [DuoNeb 3.0-0.5 MG/3 ML] 3 ml NEB Q4HRRT PRN 05/08/17 [

History]


Warfarin [Coumadin] 2.5 mg PO MOWEFR@1900 05/08/17 [History]


Aspirin [Halfprin] 81 mg PO DAILY 09/03/18 [History]


Cholecalciferol (Vitamin D3) [Vitamin D3] 4,000 unit PO DAILY 09/03/18 [History]


Escitalopram [Lexapro] 10 mg PO DAILY 09/03/18 [History]


Fluticasone Propionate [Flovent  MCG] 2 puff INH BID 09/03/18 [History]


Hydrocortisone Acetate [Anusol-Hc] 25 mg RC BID PRN 09/03/18 [History]


Latanoprost [Xalatan] 1 drop EYEBOTH DAILY 09/03/18 [History]


Magnesium Hydroxide [Milk of Magnesia] 30 ml PO DAILY PRN 09/03/18 [History]


Melatonin 3 mg PO DAILY PRN 09/03/18 [History]


Methyl Salicylate/Menthol [Muscle Rub] 1 applic TOP BID PRN 09/03/18 [History]


Non-Formulary Medication [NF Drug] 1 spray PO BID PRN 09/03/18 [History]


Phenylephrine HCl [Sudogest PE] 10 mg PO Q4HR PRN 09/03/18 [History]


Polyethylene Glycol 3350 [MiraLAX] 17 gm PO DAILY 09/03/18 [History]


Polyethylene Glycol 3350 [MiraLAX] 17 gm PO DAILY PRN 09/03/18 [History]


QUEtiapine [SEROquel] 25 mg PO TID 09/03/18 [History]


Sodium Chloride [Saline Nasal Spray] 1 ml NS ASDIRECTED PRN 09/03/18 [History]


atorvaSTATin [Lipitor] 40 mg PO DAILY 09/03/18 [History]


busPIRone [Buspar] 10 mg PO BID 09/03/18 [History]


guaiFENesin/Dextromethorphan [Tussin Dm Liquid] 10 ml PO Q4HR PRN 09/03/18 [

History]


oxyCODONE 5 mg PO Q6HR PRN 09/03/18 [History]


oxyCODONE 5 mg PO Q6HR PRN 09/03/18 [History]


Potassium Chloride [Klor-Con M20] 20 meq PO DAILY 09/04/18 [History]











Past Medical History


HEENT History: Reports: Allergic Rhinitis, Cataract, Glaucoma, Hard of Hearing, 

Impaired Vision, Macular Degeneration, Other (See Below)


Other HEENT History: Glasses, left-sided macular degeneration, bilateral 

presbycusis with no current therapy


Cardiovascular History: Reports: Afib, Arrhythmia, Blood Clots/VTE/DVT, Bypass, 

CAD, Heart Failure, High Cholesterol, Hypertension, PVD, Other (See Below)


Other Cardiovascular History: Complete right bundle branch block/ bifascicular 

bundle-branch block, atrial fibrillation with current Coumadin therapy, 

peripheral vascular disease with recurrent diabetic foot ulcers, varicose veins


Respiratory History: Reports: Bronchitis, Recurrent, COPD, Intubation, Previous

, PE, Pneumonia, Recurrent, Sleep Apnea, Other (See Below)


Other Respiratory History: Diagnosis of bilateral PE on 9/30/16 with current 

Coumadin therapy


Gastrointestinal History: Reports: Cholelithiasis, Chronic Constipation, Fecal 

Incontinence


Genitourinary History: Reports: BPH, Chronic Renal Insuffiency, Diabetic 

Nephropathy, Urinary Incontinence, UTI, Recurrent, Other (See Below)


Other Genitourinary History: Chronic condom catheter therapy


Musculoskeletal History: Reports: Arthritis, Back Pain, Chronic, Fracture, Neck 

Pain, Chronic, Osteoarthritis, Other (See Below)


Other Musculoskeletal History: spinal stenosis in the lumbar region by CT scan, 

generalized muscle weakness and myalgias, chronic pain syndrome with chronic 

narcotic therapy, apparent previous left tibial fracture with surgery as below


Neurological History: Reports: Neuropathy, Diabetic


Psychiatric History: Reports: Addiction, Anxiety, Depression, Other (See Below)


Other Psychiatric History: Chronic narcotic therapy


Endocrine/Metabolic History: Reports: Diabetes, Type II, IDDM, Obesity/BMI 30+


Hematologic History: Reports: None


Immunologic History: Reports: None


Dermatologic History: Reports: Cellulitis, Decubitus Ulcer, Venous Stasis 

Dermatitis, Other (See Below)


Other Dermatologic History: Chronic decubiti and diabetic ulcers





- Past Surgical History


HEENT Surgical History: Reports: Adenoidectomy, Cataract Surgery, Oral Surgery, 

Tonsillectomy, Other (See Below)


Cardiovascular Surgical History: Reports: Coronary Artery Bypass


Respiratory Surgical History: Reports: Thoracotomy, Other (See Below)


GI Surgical History: Reports: Cholecystectomy, Other (See Below)


Male  Surgical History: Reports: Circumcision, Other (See Below)


Neurological Surgical History: Reports: Lumbar Spine, Spinal Fusion


Musculoskeletal Surgical History: Reports: ORIF, Other (See Below)





- Past Imaging History


Past Imaging History: Reports: CAT Scan





- History Comment


History Comment: Patient is a somewhat poor historian





Social & Family History





- Family History


Family Medical History: Unobtainable





- Caffeine Use


Caffeine Use: Reports: None





- Living Situation & Occupation


Living situation: Reports: , Extended Care Facility


Occupation: Retired





ED ROS GENERAL





- Review of Systems


Review Of Systems: See Below


Constitutional: Reports: Fever, Chills (In the nursing home), Night Sweats


HEENT: Reports: No Symptoms


Respiratory: Reports: Shortness of Breath, Wheezing (On arrival)


Cardiovascular: Reports: Chest Pain, Blood Pressure Problem (Hypotensive), Edema


GI/Abdominal: Reports: No Symptoms


: Reports: Other (Condom catheter)


Musculoskeletal: Reports: Other (Left forefoot amputation redness of lower 

extremities)


Skin: Reports: Erythema (Lower extremity)


Neurological: Reports: Other (Patient unresponsive)


Psychiatric: Reports: Anxiety, Depression


Hematologic/Lymphatic: Reports: No Symptoms


Immunologic: Reports: No Symptoms





ED EXAM, GENERAL





- Physical Exam


Exam: See Below


Exam Limited By: Physical Impairment (Obese difficult to move)


General Appearance: Other (Unresponsive on arrival)


Eye Exam: Bilateral Eye: PERRL (Bilateral pupils 3 mm responsive to light)


Ears: Normal External Exam, Normal Canal, Hearing Grossly Normal, Normal TMs


Nose: Normal Inspection, Normal Mucosa, No Blood


Throat/Mouth: Normal Inspection, Normal Lips, Normal Teeth, Normal Gums, Normal 

Oropharynx, Normal Voice, No Airway Compromise


Head: Atraumatic, Normocephalic


Neck: Normal Inspection, Supple


Respiratory/Chest: Decreased Breath Sounds, Rales


Cardiovascular: Normal Peripheral Pulses, No Murmur, Tachycardia, Other (

Distant heart sounds)


GI/Abdominal: Normal Bowel Sounds, Soft, No Mass, Pelvis Stable


 (Male) Exam: Other (Patient had a condom catheter this was switched to a 

indwelling)


Rectal (Males) Exam: Deferred


Back Exam: Decreased Range of Motion


Extremities: Pedal Edema (4 out of 4 lower extremities bilaterally), Other (

Left forefoot amputation)


Neurological: Unresponsive


Skin Exam: Warm, Erythema (Bilateral lower extremities)





Course





- Vital Signs


Last Recorded V/S: 





 Last Vital Signs











Temp  101.3 F H  09/03/18 22:38


 


Pulse  109 H  09/03/18 22:38


 


Resp  28 H  09/03/18 22:38


 


BP  123/34 L  09/03/18 22:38


 


Pulse Ox  90 L  09/03/18 22:38














- Orders/Labs/Meds


Orders: 





 Active Orders 24 hr











 Category Date Time Status


 


 Accu Check [Blood Glucose Check, Bedside] [RC] ONETIME Care  09/03/18 22:54 

Ordered


 


 EKG Documentation Completion [RC] ASDIRECTED Care  09/03/18 23:09 Ordered


 


 Rodriguez Catheter Insertion [Insert Urinary Catheter] [OM. Care  09/03/18 23:45 

Ordered





 PC] Q24H   


 


 Urinary Catheter Assessment [RC] ASDIRECTED Care  09/03/18 23:37 Ordered


 


 Chest 1V Frontal [CR] Stat Exams  09/03/18 22:57 Ordered


 


 CULTURE BLOOD [BC] Stat Lab  09/03/18 22:53 Ordered


 


 CULTURE BLOOD [BC] Stat Lab  09/03/18 22:53 Ordered


 


 UA W/MICROSCOPIC [URIN] Stat Lab  09/03/18 23:37 Ordered


 


 Sodium Chloride 0.9% [Normal Saline] 1,000 ml Med  09/03/18 23:00 Ordered





 IV ASDIRECTED   


 


 Sodium Chloride 0.9% [Saline Flush] Med  09/03/18 22:54 Ordered





 10 ml FLUSH ASDIRECTED PRN   


 


 Blood Culture x2 Reflex Set [OM.PC] Stat Oth  09/03/18 22:52 Ordered


 


 Saline Lock Insert [OM.PC] Routine Oth  09/03/18 22:54 Ordered








 Medication Orders





Sodium Chloride (Normal Saline)  1,000 mls @ 250 mls/hr IV ASDIRECTED SANDRO


   Last Admin: 09/03/18 23:10  Dose: 250 mls/hr


Sodium Chloride (Saline Flush)  10 ml FLUSH ASDIRECTED PRN


   PRN Reason: Keep Vein Open








Labs: 





 Laboratory Tests











  09/03/18 09/03/18 09/03/18 Range/Units





  23:00 23:00 23:00 


 


WBC     (4.0-10.2)  K/uL


 


RBC     (4.33-5.41)  M/uL


 


Hgb     (13.1-16.8)  g/dL


 


Hct     (39.0-49.0)  %


 


MCV     (84.0-98.0)  fL


 


MCH     (28.2-33.3)  pg


 


MCHC     (31.7-36.0)  g/dL


 


RDW     (11.2-14.1)  %


 


Plt Count     (150-350)  K/uL


 


Neut % (Auto)     (45.0-80.0)  %


 


Lymph % (Auto)     (10.0-50.0)  %


 


Mono % (Auto)     (2.0-14.0)  %


 


Eos % (Auto)     (0.0-5.0)  %


 


Baso % (Auto)     (0.0-2.0)  %


 


Neut # (Auto)     (1.40-7.00)  K/uL


 


Lymph # (Auto)     (0.50-3.50)  K/uL


 


Mono # (Auto)     (0.00-1.00)  K/uL


 


Eos # (Auto)     (0.00-0.50)  K/uL


 


Baso # (Auto)     (0.00-0.20)  K/uL


 


PT     (9.8-11.7)  SEC


 


INR     


 


ABG pH     (7.35-7.45)  


 


ABG pCO2     (35-45)  mmHG


 


ABG pO2     ()  mmHG


 


ABG HCO3     (22-26)  mmol/L


 


ABG Total CO2     (23-27)  mmol/L


 


ABG O2 Saturation     (95-98)  %


 


ABG Base Excess     (-2-3)  mmol/L


 


O2 Delivery Device     


 


Sodium  144    (136-145)  mmol/L


 


Potassium  4.0    (3.5-5.1)  mmol/L


 


Chloride  102    ()  mmol/L


 


Carbon Dioxide  38.5 H    (21.0-32.0)  mmol/L


 


BUN  35 H    (7-18)  mg/dL


 


Creatinine  1.27 H    (0.51-1.17)  mg/dL


 


Est Cr Clr Drug Dosing  TNP    


 


Estimated GFR (MDRD)  56    mL/min


 


Glucose  211 H    ()  mg/dL


 


Lactic Acid   2.3 H   (0.4-2.0)  mmol/L


 


Calcium  8.9    (8.5-10.1)  mg/dL


 


Total Bilirubin  0.2    (0.2-1.0)  mg/dL


 


AST  37    (15-37)  U/L


 


ALT  27    (12-78)  U/L


 


Alkaline Phosphatase  96    ()  IU/L


 


Troponin I    0.005  (0.000-0.056)  ng/mL


 


NT-Pro-B Natriuret Pep    3259 H  (0-125)  pg/mL


 


Total Protein  7.3    (6.4-8.2)  g/dL


 


Albumin  2.4 L    (3.4-5.0)  g/dL














  09/03/18 09/03/18 09/03/18 Range/Units





  23:00 23:10 23:30 


 


WBC   14.8 H   (4.0-10.2)  K/uL


 


RBC   3.97 L   (4.33-5.41)  M/uL


 


Hgb   10.8 L   (13.1-16.8)  g/dL


 


Hct   37.9 L   (39.0-49.0)  %


 


MCV   95.5  D   (84.0-98.0)  fL


 


MCH   27.2 L   (28.2-33.3)  pg


 


MCHC   28.5 L   (31.7-36.0)  g/dL


 


RDW   16.1 H   (11.2-14.1)  %


 


Plt Count   266   (150-350)  K/uL


 


Neut % (Auto)   89.5 H   (45.0-80.0)  %


 


Lymph % (Auto)   4.7 L   (10.0-50.0)  %


 


Mono % (Auto)   5.0   (2.0-14.0)  %


 


Eos % (Auto)   0.7   (0.0-5.0)  %


 


Baso % (Auto)   0.1   (0.0-2.0)  %


 


Neut # (Auto)   13.27 H   (1.40-7.00)  K/uL


 


Lymph # (Auto)   0.69   (0.50-3.50)  K/uL


 


Mono # (Auto)   0.74   (0.00-1.00)  K/uL


 


Eos # (Auto)   0.11   (0.00-0.50)  K/uL


 


Baso # (Auto)   0.01   (0.00-0.20)  K/uL


 


PT  23.4 H    (9.8-11.7)  SEC


 


INR  2.1    


 


ABG pH    7.28 L*  (7.35-7.45)  


 


ABG pCO2    83 H*  (35-45)  mmHG


 


ABG pO2    72 L*  ()  mmHG


 


ABG HCO3    39.0 H  (22-26)  mmol/L


 


ABG Total CO2    42 H  (23-27)  mmol/L


 


ABG O2 Saturation    91 L  (95-98)  %


 


ABG Base Excess    12 H  (-2-3)  mmol/L


 


O2 Delivery Device    Non rebr mask  


 


Sodium     (136-145)  mmol/L


 


Potassium     (3.5-5.1)  mmol/L


 


Chloride     ()  mmol/L


 


Carbon Dioxide     (21.0-32.0)  mmol/L


 


BUN     (7-18)  mg/dL


 


Creatinine     (0.51-1.17)  mg/dL


 


Est Cr Clr Drug Dosing     


 


Estimated GFR (MDRD)     mL/min


 


Glucose     ()  mg/dL


 


Lactic Acid     (0.4-2.0)  mmol/L


 


Calcium     (8.5-10.1)  mg/dL


 


Total Bilirubin     (0.2-1.0)  mg/dL


 


AST     (15-37)  U/L


 


ALT     (12-78)  U/L


 


Alkaline Phosphatase     ()  IU/L


 


Troponin I     (0.000-0.056)  ng/mL


 


NT-Pro-B Natriuret Pep     (0-125)  pg/mL


 


Total Protein     (6.4-8.2)  g/dL


 


Albumin     (3.4-5.0)  g/dL











Meds: 





Medications











Generic Name Dose Route Start Last Admin





  Trade Name Freq  PRN Reason Stop Dose Admin


 


Sodium Chloride  1,000 mls @ 250 mls/hr  09/03/18 23:00  09/03/18 23:10





  Normal Saline  IV   250 mls/hr





  ASDIRECTED SANDRO   Administration





     





     





     





     


 


Sodium Chloride  10 ml  09/03/18 22:54  





  Saline Flush  FLUSH   





  ASDIRECTED PRN   





  Keep Vein Open   





     





     





     














Departure





- Departure


Time of Disposition: 00:26


Disposition: Admitted As Inpatient 65


Clinical Impression: 


 Sepsis, Chronic congestive heart failure, Congenital heart disease in adult








- Discharge Information


Referrals: 


Sheets-Melina Guillaume MD [Primary Care Provider] - 





- Problem List & Annotations


(1) Sepsis


SNOMED Code(s): 15632707


   Code(s): A41.9 - SEPSIS, UNSPECIFIED ORGANISM   Status: Acute   Annotation/

Comment:: Patient is a sepsis lactic acid of 2.3 WBC 14.7. Urine pending will 

admit for antibiotics using Zosyn 3.375 every 6 hours plus Vanco 1 g every 24 

hours discussed starting on Levaquin reconsidered secondary prolonged RI 

interval a side effect of medication   





(2) Congestive heart failure


SNOMED Code(s): 00994807


   Code(s): I50.9 - HEART FAILURE, UNSPECIFIED   Status: Acute   


Qualifiers: 


   Heart failure type: unspecified   Heart failure chronicity: acute on chronic 





- Problem List Review


Problem List Initiated/Reviewed/Updated: Yes





- My Orders


Last 24 Hours: 





My Active Orders





09/03/18 22:52


Blood Culture x2 Reflex Set [OM.PC] Stat 





09/03/18 22:53


CULTURE BLOOD [BC] Stat 


CULTURE BLOOD [BC] Stat 





09/03/18 22:54


Accu Check [Blood Glucose Check, Bedside] [RC] ONETIME 


Sodium Chloride 0.9% [Saline Flush]   10 ml FLUSH ASDIRECTED PRN 


Saline Lock Insert [OM.PC] Routine 





09/03/18 22:57


Chest 1V Frontal [CR] Stat 





09/03/18 23:00


Sodium Chloride 0.9% [Normal Saline] 1,000 ml IV ASDIRECTED 





09/03/18 23:09


EKG Documentation Completion [RC] ASDIRECTED 





09/03/18 23:37


Urinary Catheter Assessment [RC] ASDIRECTED 


UA W/MICROSCOPIC [URIN] Stat 





09/03/18 23:45


Rodriguez Catheter Insertion [Insert Urinary Catheter] [OM.PC] Q24H 














- Assessment/Plan


Admission H&P: Please use this note as an admission H&P


Last 24 Hours: 





My Active Orders





09/03/18 22:52


Blood Culture x2 Reflex Set [OM.PC] Stat 





09/03/18 22:53


CULTURE BLOOD [BC] Stat 


CULTURE BLOOD [BC] Stat 





09/03/18 22:54


Accu Check [Blood Glucose Check, Bedside] [RC] ONETIME 


Sodium Chloride 0.9% [Saline Flush]   10 ml FLUSH ASDIRECTED PRN 


Saline Lock Insert [OM.PC] Routine 





09/03/18 22:57


Chest 1V Frontal [CR] Stat 





09/03/18 23:00


Sodium Chloride 0.9% [Normal Saline] 1,000 ml IV ASDIRECTED 





09/03/18 23:09


EKG Documentation Completion [RC] ASDIRECTED 





09/03/18 23:37


Urinary Catheter Assessment [RC] ASDIRECTED 


UA W/MICROSCOPIC [URIN] Stat 





09/03/18 23:45


Rodriguez Catheter Insertion [Insert Urinary Catheter] [OM.PC] Q24H 











Plan: 


Patient is a no code probably not tolerate transfer at this time discussed with 

the [facility] and agreed to keep him here since he is a no code in his 

condition is critical.

## 2018-09-04 NOTE — PCM.SN
- Free Text/Narrative


Note: 





09//04/18


Jorge A Guillaume MD


Called [facility] ~14:45. Talked with Bree and Dr. Vega. Dr. Vega requests 

blood gas repeat and call him back. At 15:35 called [facility] back. Dr. Vega does not 

accept [place] for transfer at this time. Pulmonologist is not available at [facility] 

this week.

## 2018-09-04 NOTE — PCM.PN
- General Info


Date of Service: 09/04/18


Functional Status: Reports: Pain Controlled





- Review of Systems


General: Reports: Weakness


HEENT: Reports: No Symptoms


Pulmonary: Reports: Cough, Other (hypoxia)


Cardiovascular: Reports: No Symptoms


Gastrointestinal: Reports: No Symptoms


Genitourinary: Reports: No Symptoms


Musculoskeletal: Reports: No Symptoms


Skin: Reports: Bruising


Neurological: Reports: Weakness, Other (lethargy)


Psychiatric: Reports: No Symptoms





- Patient Data


Vitals - Most Recent: 


 Last Vital Signs











Temp  97.4 F   09/04/18 12:00


 


Pulse  98   09/04/18 13:12


 


Resp  10 L  09/04/18 12:00


 


BP  118/57 L  09/04/18 12:00


 


Pulse Ox  90 L  09/04/18 13:12











Weight - Most Recent: 343 lb 14.738 oz


I&O - Last 24 Hours: 


 Intake & Output











 09/03/18 09/04/18 09/04/18





 22:59 06:59 14:59


 


Intake Total   920


 


Output Total   400


 


Balance   520











Lab Results Last 24 Hours: 


 Laboratory Results - last 24 hr











  09/03/18 09/03/18 09/03/18 Range/Units





  23:00 23:00 23:00 


 


WBC     (4.0-10.2)  K/uL


 


RBC     (4.33-5.41)  M/uL


 


Hgb     (13.1-16.8)  g/dL


 


Hct     (39.0-49.0)  %


 


MCV     (84.0-98.0)  fL


 


MCH     (28.2-33.3)  pg


 


MCHC     (31.7-36.0)  g/dL


 


RDW     (11.2-14.1)  %


 


Plt Count     (150-350)  K/uL


 


MPV     (7.00-11.50)  fL


 


Neut % (Auto)     (45.0-80.0)  %


 


Lymph % (Auto)     (10.0-50.0)  %


 


Mono % (Auto)     (2.0-14.0)  %


 


Eos % (Auto)     (0.0-5.0)  %


 


Baso % (Auto)     (0.0-2.0)  %


 


Neut # (Auto)     (1.40-7.00)  K/uL


 


Lymph # (Auto)     (0.50-3.50)  K/uL


 


Mono # (Auto)     (0.00-1.00)  K/uL


 


Eos # (Auto)     (0.00-0.50)  K/uL


 


Baso # (Auto)     (0.00-0.20)  K/uL


 


PT     (9.8-11.7)  SEC


 


INR     


 


ABG pH     (7.35-7.45)  


 


ABG pCO2     (35-45)  mmHG


 


ABG pO2     ()  mmHG


 


ABG HCO3     (22-26)  mmol/L


 


ABG Total CO2     (23-27)  mmol/L


 


ABG O2 Saturation     (95-98)  %


 


ABG Base Excess     (-2-3)  mmol/L


 


O2 Delivery Device     


 


Sodium  144    (136-145)  mmol/L


 


Potassium  4.0    (3.5-5.1)  mmol/L


 


Chloride  102    ()  mmol/L


 


Carbon Dioxide  38.5 H    (21.0-32.0)  mmol/L


 


BUN  35 H    (7-18)  mg/dL


 


Creatinine  1.27 H    (0.51-1.17)  mg/dL


 


Est Cr Clr Drug Dosing  TNP    


 


Estimated GFR (MDRD)  56    mL/min


 


Glucose  211 H    ()  mg/dL


 


POC Glucose     ()  mg/dl


 


Lactic Acid   2.3 H   (0.4-2.0)  mmol/L


 


Calcium  8.9    (8.5-10.1)  mg/dL


 


Total Bilirubin  0.2    (0.2-1.0)  mg/dL


 


AST  37    (15-37)  U/L


 


ALT  27    (12-78)  U/L


 


Alkaline Phosphatase  96    ()  IU/L


 


Troponin I    0.005  (0.000-0.056)  ng/mL


 


C-Reactive Protein     (<=0.9)  mg/dL


 


NT-Pro-B Natriuret Pep    3259 H  (0-125)  pg/mL


 


Total Protein  7.3    (6.4-8.2)  g/dL


 


Albumin  2.4 L    (3.4-5.0)  g/dL


 


Specimen Type     


 


Urine Color     


 


Urine Appearance     


 


Urine pH     (5.0-9.0)  


 


Ur Specific Gravity     (1.005-1.030)  


 


Urine Protein     (NEGATIVE)  mg/dL


 


Urine Glucose (UA)     (NEGATIVE)  mg/dL


 


Urine Ketones     (NEGATIVE)  mg/dL


 


Urine Occult Blood     (NEGATIVE)  


 


Urine Nitrite     (NEGATIVE)  


 


Urine Bilirubin     (NEGATIVE)  


 


Urine Urobilinogen     (0.2-1.0)  E.U./dL


 


Ur Leukocyte Esterase     (NEGATIVE)  


 


Urine RBC     /HPF


 


Urine WBC     /HPF


 


Ur Epithelial Cells     /LPF


 


Urine Bacteria     (NONE TO FEW)  /HPF


 


Theophylline     (10-20)  ug/dL














  09/03/18 09/03/18 09/03/18 Range/Units





  23:00 23:10 23:30 


 


WBC   14.8 H   (4.0-10.2)  K/uL


 


RBC   3.97 L   (4.33-5.41)  M/uL


 


Hgb   10.8 L   (13.1-16.8)  g/dL


 


Hct   37.9 L   (39.0-49.0)  %


 


MCV   95.5  D   (84.0-98.0)  fL


 


MCH   27.2 L   (28.2-33.3)  pg


 


MCHC   28.5 L   (31.7-36.0)  g/dL


 


RDW   16.1 H   (11.2-14.1)  %


 


Plt Count   266   (150-350)  K/uL


 


MPV     (7.00-11.50)  fL


 


Neut % (Auto)   89.5 H   (45.0-80.0)  %


 


Lymph % (Auto)   4.7 L   (10.0-50.0)  %


 


Mono % (Auto)   5.0   (2.0-14.0)  %


 


Eos % (Auto)   0.7   (0.0-5.0)  %


 


Baso % (Auto)   0.1   (0.0-2.0)  %


 


Neut # (Auto)   13.27 H   (1.40-7.00)  K/uL


 


Lymph # (Auto)   0.69   (0.50-3.50)  K/uL


 


Mono # (Auto)   0.74   (0.00-1.00)  K/uL


 


Eos # (Auto)   0.11   (0.00-0.50)  K/uL


 


Baso # (Auto)   0.01   (0.00-0.20)  K/uL


 


PT  23.4 H    (9.8-11.7)  SEC


 


INR  2.1    


 


ABG pH    7.28 L*  (7.35-7.45)  


 


ABG pCO2    83 H*  (35-45)  mmHG


 


ABG pO2    72 L*  ()  mmHG


 


ABG HCO3    39.0 H  (22-26)  mmol/L


 


ABG Total CO2    42 H  (23-27)  mmol/L


 


ABG O2 Saturation    91 L  (95-98)  %


 


ABG Base Excess    12 H  (-2-3)  mmol/L


 


O2 Delivery Device    Non rebr mask  


 


Sodium     (136-145)  mmol/L


 


Potassium     (3.5-5.1)  mmol/L


 


Chloride     ()  mmol/L


 


Carbon Dioxide     (21.0-32.0)  mmol/L


 


BUN     (7-18)  mg/dL


 


Creatinine     (0.51-1.17)  mg/dL


 


Est Cr Clr Drug Dosing     


 


Estimated GFR (MDRD)     mL/min


 


Glucose     ()  mg/dL


 


POC Glucose     ()  mg/dl


 


Lactic Acid     (0.4-2.0)  mmol/L


 


Calcium     (8.5-10.1)  mg/dL


 


Total Bilirubin     (0.2-1.0)  mg/dL


 


AST     (15-37)  U/L


 


ALT     (12-78)  U/L


 


Alkaline Phosphatase     ()  IU/L


 


Troponin I     (0.000-0.056)  ng/mL


 


C-Reactive Protein     (<=0.9)  mg/dL


 


NT-Pro-B Natriuret Pep     (0-125)  pg/mL


 


Total Protein     (6.4-8.2)  g/dL


 


Albumin     (3.4-5.0)  g/dL


 


Specimen Type     


 


Urine Color     


 


Urine Appearance     


 


Urine pH     (5.0-9.0)  


 


Ur Specific Gravity     (1.005-1.030)  


 


Urine Protein     (NEGATIVE)  mg/dL


 


Urine Glucose (UA)     (NEGATIVE)  mg/dL


 


Urine Ketones     (NEGATIVE)  mg/dL


 


Urine Occult Blood     (NEGATIVE)  


 


Urine Nitrite     (NEGATIVE)  


 


Urine Bilirubin     (NEGATIVE)  


 


Urine Urobilinogen     (0.2-1.0)  E.U./dL


 


Ur Leukocyte Esterase     (NEGATIVE)  


 


Urine RBC     /HPF


 


Urine WBC     /HPF


 


Ur Epithelial Cells     /LPF


 


Urine Bacteria     (NONE TO FEW)  /HPF


 


Theophylline     (10-20)  ug/dL














  09/03/18 09/04/18 09/04/18 Range/Units





  23:37 06:50 06:50 


 


WBC   21.8 H   (4.0-10.2)  K/uL


 


RBC   3.85 L   (4.33-5.41)  M/uL


 


Hgb   10.6 L   (13.1-16.8)  g/dL


 


Hct   37.6 L   (39.0-49.0)  %


 


MCV   97.7   (84.0-98.0)  fL


 


MCH   27.5 L   (28.2-33.3)  pg


 


MCHC   28.2 L   (31.7-36.0)  g/dL


 


RDW   16.2 H   (11.2-14.1)  %


 


Plt Count   251   (150-350)  K/uL


 


MPV   8.70   (7.00-11.50)  fL


 


Neut % (Auto)     (45.0-80.0)  %


 


Lymph % (Auto)     (10.0-50.0)  %


 


Mono % (Auto)     (2.0-14.0)  %


 


Eos % (Auto)     (0.0-5.0)  %


 


Baso % (Auto)     (0.0-2.0)  %


 


Neut # (Auto)     (1.40-7.00)  K/uL


 


Lymph # (Auto)     (0.50-3.50)  K/uL


 


Mono # (Auto)     (0.00-1.00)  K/uL


 


Eos # (Auto)     (0.00-0.50)  K/uL


 


Baso # (Auto)     (0.00-0.20)  K/uL


 


PT     (9.8-11.7)  SEC


 


INR     


 


ABG pH     (7.35-7.45)  


 


ABG pCO2     (35-45)  mmHG


 


ABG pO2     ()  mmHG


 


ABG HCO3     (22-26)  mmol/L


 


ABG Total CO2     (23-27)  mmol/L


 


ABG O2 Saturation     (95-98)  %


 


ABG Base Excess     (-2-3)  mmol/L


 


O2 Delivery Device     


 


Sodium    145  (136-145)  mmol/L


 


Potassium    4.4  (3.5-5.1)  mmol/L


 


Chloride    104  ()  mmol/L


 


Carbon Dioxide    38.5 H  (21.0-32.0)  mmol/L


 


BUN    40 H  (7-18)  mg/dL


 


Creatinine    1.49 H  (0.51-1.17)  mg/dL


 


Est Cr Clr Drug Dosing    46.83  


 


Estimated GFR (MDRD)    46  mL/min


 


Glucose    260 H  ()  mg/dL


 


POC Glucose     ()  mg/dl


 


Lactic Acid     (0.4-2.0)  mmol/L


 


Calcium    8.8  (8.5-10.1)  mg/dL


 


Total Bilirubin     (0.2-1.0)  mg/dL


 


AST     (15-37)  U/L


 


ALT     (12-78)  U/L


 


Alkaline Phosphatase     ()  IU/L


 


Troponin I     (0.000-0.056)  ng/mL


 


C-Reactive Protein     (<=0.9)  mg/dL


 


NT-Pro-B Natriuret Pep     (0-125)  pg/mL


 


Total Protein     (6.4-8.2)  g/dL


 


Albumin     (3.4-5.0)  g/dL


 


Specimen Type  Urinvoid    


 


Urine Color  Yellow    


 


Urine Appearance  Cloudy    


 


Urine pH  5.5    (5.0-9.0)  


 


Ur Specific Gravity  1.020    (1.005-1.030)  


 


Urine Protein  100 H    (NEGATIVE)  mg/dL


 


Urine Glucose (UA)  Negative    (NEGATIVE)  mg/dL


 


Urine Ketones  Negative    (NEGATIVE)  mg/dL


 


Urine Occult Blood  Large H    (NEGATIVE)  


 


Urine Nitrite  Positive H    (NEGATIVE)  


 


Urine Bilirubin  Negative    (NEGATIVE)  


 


Urine Urobilinogen  0.2    (0.2-1.0)  E.U./dL


 


Ur Leukocyte Esterase  Small H    (NEGATIVE)  


 


Urine RBC  >100 H    /HPF


 


Urine WBC  >100 H    /HPF


 


Ur Epithelial Cells  Few    /LPF


 


Urine Bacteria  Many H    (NONE TO FEW)  /HPF


 


Theophylline     (10-20)  ug/dL














  09/04/18 09/04/18 09/04/18 Range/Units





  06:50 06:50 07:00 


 


WBC     (4.0-10.2)  K/uL


 


RBC     (4.33-5.41)  M/uL


 


Hgb     (13.1-16.8)  g/dL


 


Hct     (39.0-49.0)  %


 


MCV     (84.0-98.0)  fL


 


MCH     (28.2-33.3)  pg


 


MCHC     (31.7-36.0)  g/dL


 


RDW     (11.2-14.1)  %


 


Plt Count     (150-350)  K/uL


 


MPV     (7.00-11.50)  fL


 


Neut % (Auto)     (45.0-80.0)  %


 


Lymph % (Auto)     (10.0-50.0)  %


 


Mono % (Auto)     (2.0-14.0)  %


 


Eos % (Auto)     (0.0-5.0)  %


 


Baso % (Auto)     (0.0-2.0)  %


 


Neut # (Auto)     (1.40-7.00)  K/uL


 


Lymph # (Auto)     (0.50-3.50)  K/uL


 


Mono # (Auto)     (0.00-1.00)  K/uL


 


Eos # (Auto)     (0.00-0.50)  K/uL


 


Baso # (Auto)     (0.00-0.20)  K/uL


 


PT     (9.8-11.7)  SEC


 


INR     


 


ABG pH    7.26 L*  (7.35-7.45)  


 


ABG pCO2    94 H*  (35-45)  mmHG


 


ABG pO2    69 L*  ()  mmHG


 


ABG HCO3    42.0 H  (22-26)  mmol/L


 


ABG Total CO2    44 H  (23-27)  mmol/L


 


ABG O2 Saturation    89 L  (95-98)  %


 


ABG Base Excess    14 H  (-2-3)  mmol/L


 


O2 Delivery Device    Bipap  


 


Sodium     (136-145)  mmol/L


 


Potassium     (3.5-5.1)  mmol/L


 


Chloride     ()  mmol/L


 


Carbon Dioxide     (21.0-32.0)  mmol/L


 


BUN     (7-18)  mg/dL


 


Creatinine     (0.51-1.17)  mg/dL


 


Est Cr Clr Drug Dosing     


 


Estimated GFR (MDRD)     mL/min


 


Glucose     ()  mg/dL


 


POC Glucose     ()  mg/dl


 


Lactic Acid     (0.4-2.0)  mmol/L


 


Calcium     (8.5-10.1)  mg/dL


 


Total Bilirubin     (0.2-1.0)  mg/dL


 


AST     (15-37)  U/L


 


ALT     (12-78)  U/L


 


Alkaline Phosphatase     ()  IU/L


 


Troponin I     (0.000-0.056)  ng/mL


 


C-Reactive Protein  12.0 H    (<=0.9)  mg/dL


 


NT-Pro-B Natriuret Pep     (0-125)  pg/mL


 


Total Protein     (6.4-8.2)  g/dL


 


Albumin     (3.4-5.0)  g/dL


 


Specimen Type     


 


Urine Color     


 


Urine Appearance     


 


Urine pH     (5.0-9.0)  


 


Ur Specific Gravity     (1.005-1.030)  


 


Urine Protein     (NEGATIVE)  mg/dL


 


Urine Glucose (UA)     (NEGATIVE)  mg/dL


 


Urine Ketones     (NEGATIVE)  mg/dL


 


Urine Occult Blood     (NEGATIVE)  


 


Urine Nitrite     (NEGATIVE)  


 


Urine Bilirubin     (NEGATIVE)  


 


Urine Urobilinogen     (0.2-1.0)  E.U./dL


 


Ur Leukocyte Esterase     (NEGATIVE)  


 


Urine RBC     /HPF


 


Urine WBC     /HPF


 


Ur Epithelial Cells     /LPF


 


Urine Bacteria     (NONE TO FEW)  /HPF


 


Theophylline   9 L   (10-20)  ug/dL














  09/04/18 09/04/18 Range/Units





  07:48 10:16 


 


WBC    (4.0-10.2)  K/uL


 


RBC    (4.33-5.41)  M/uL


 


Hgb    (13.1-16.8)  g/dL


 


Hct    (39.0-49.0)  %


 


MCV    (84.0-98.0)  fL


 


MCH    (28.2-33.3)  pg


 


MCHC    (31.7-36.0)  g/dL


 


RDW    (11.2-14.1)  %


 


Plt Count    (150-350)  K/uL


 


MPV    (7.00-11.50)  fL


 


Neut % (Auto)    (45.0-80.0)  %


 


Lymph % (Auto)    (10.0-50.0)  %


 


Mono % (Auto)    (2.0-14.0)  %


 


Eos % (Auto)    (0.0-5.0)  %


 


Baso % (Auto)    (0.0-2.0)  %


 


Neut # (Auto)    (1.40-7.00)  K/uL


 


Lymph # (Auto)    (0.50-3.50)  K/uL


 


Mono # (Auto)    (0.00-1.00)  K/uL


 


Eos # (Auto)    (0.00-0.50)  K/uL


 


Baso # (Auto)    (0.00-0.20)  K/uL


 


PT    (9.8-11.7)  SEC


 


INR    


 


ABG pH    (7.35-7.45)  


 


ABG pCO2    (35-45)  mmHG


 


ABG pO2    ()  mmHG


 


ABG HCO3    (22-26)  mmol/L


 


ABG Total CO2    (23-27)  mmol/L


 


ABG O2 Saturation    (95-98)  %


 


ABG Base Excess    (-2-3)  mmol/L


 


O2 Delivery Device    


 


Sodium    (136-145)  mmol/L


 


Potassium    (3.5-5.1)  mmol/L


 


Chloride    ()  mmol/L


 


Carbon Dioxide    (21.0-32.0)  mmol/L


 


BUN    (7-18)  mg/dL


 


Creatinine    (0.51-1.17)  mg/dL


 


Est Cr Clr Drug Dosing    


 


Estimated GFR (MDRD)    mL/min


 


Glucose    ()  mg/dL


 


POC Glucose  230 H  253 H*  ()  mg/dl


 


Lactic Acid    (0.4-2.0)  mmol/L


 


Calcium    (8.5-10.1)  mg/dL


 


Total Bilirubin    (0.2-1.0)  mg/dL


 


AST    (15-37)  U/L


 


ALT    (12-78)  U/L


 


Alkaline Phosphatase    ()  IU/L


 


Troponin I    (0.000-0.056)  ng/mL


 


C-Reactive Protein    (<=0.9)  mg/dL


 


NT-Pro-B Natriuret Pep    (0-125)  pg/mL


 


Total Protein    (6.4-8.2)  g/dL


 


Albumin    (3.4-5.0)  g/dL


 


Specimen Type    


 


Urine Color    


 


Urine Appearance    


 


Urine pH    (5.0-9.0)  


 


Ur Specific Gravity    (1.005-1.030)  


 


Urine Protein    (NEGATIVE)  mg/dL


 


Urine Glucose (UA)    (NEGATIVE)  mg/dL


 


Urine Ketones    (NEGATIVE)  mg/dL


 


Urine Occult Blood    (NEGATIVE)  


 


Urine Nitrite    (NEGATIVE)  


 


Urine Bilirubin    (NEGATIVE)  


 


Urine Urobilinogen    (0.2-1.0)  E.U./dL


 


Ur Leukocyte Esterase    (NEGATIVE)  


 


Urine RBC    /HPF


 


Urine WBC    /HPF


 


Ur Epithelial Cells    /LPF


 


Urine Bacteria    (NONE TO FEW)  /HPF


 


Theophylline    (10-20)  ug/dL











Med Orders - Current: 


 Current Medications





Albuterol (Proventil Neb Soln)  2.5 mg NEB Q2H PRN


   PRN Reason: Dyspnea


Albuterol/Ipratropium (Duoneb 3.0-0.5 Mg/3 Ml)  3 ml INH Q4H [facility]


   Last Admin: 09/04/18 13:11 Dose:  3 ml


Guaifenesin/Dextromethorphan (Robitussin Dm)  10 ml PO Q4H PRN


   PRN Reason: Cough


Hydrocortisone Acetate (Anucort-Hc)  25 mg .XX BID PRN


   PRN Reason: Hemorrhoids


Piperacillin Sod/Tazobactam (Sod 3.375 gm/ Sodium Chloride)  100 mls @ 200 mls/

hr IV Q6H [facility]


   Last Admin: 09/04/18 12:36 Dose:  200 mls/hr


Vancomycin HCl 1.5 gm/ Sodium (Chloride)  500 mls @ 220 mls/hr IV Q24H [facility]


Insulin Glargine (Lantus)  0 unit SUBCUT DAILY@1000 [facility]


   Last Admin: 09/04/18 10:25 Dose:  Not Given


Latanoprost (Xalatan 0.005% Ophth Soln)  0 ml EYEBOTH DAILY [facility]


   Last Admin: 09/04/18 08:34 Dose:  1 drop


Lorazepam (Ativan)  0.25 mg PO TID@1000,1500,1900 PRN


   PRN Reason: Anxiety


Magnesium Hydroxide (Milk Of Magnesia)  30 ml PO DAILY PRN


   PRN Reason: Constipation


Melatonin (Melatonin)  3 mg PO DAILY PRN


   PRN Reason: Insomnia


Methadone HCl (Methadone)  10 mg PO BID@1000,1900 [facility]


   Last Admin: 09/04/18 10:24 Dose:  10 mg


Oxycodone HCl (Oxycodone)  5 mg PO Q6HR PRN


   PRN Reason: Pain


Sodium Chloride (Saline Flush)  10 ml FLUSH ASDIRECTED PRN


   PRN Reason: Keep Vein Open


   Last Admin: 09/04/18 00:30 Dose:  10 ml


Sodium Chloride (Ocean Nasal Spray)  0 ml JOVITA ASDIRECTED PRN


   PRN Reason: Dryness


Theophylline (Theophylline Anhydrous)  300 mg PO BID@1000,1900 [facility]


   Last Admin: 09/04/18 10:25 Dose:  300 mg


Triamcinolone Acetonide (Triamcinolone Acetonide 0.1% Crm)  15 gm TOP BID PRN


   PRN Reason: Itching


Warfarin Sodium (Coumadin)  2.5 mg PO MoWeFr@1800 [facility]


Warfarin Sodium (Coumadin)  5 mg PO SuTuThSa@1800 [facility]





Discontinued Medications





Albuterol (Ventolin Hfa)  0 gm INH Q4H PRN


   PRN Reason: Shortness of Breath


Albuterol/Ipratropium (Duoneb 3.0-0.5 Mg/3 Ml)  3 ml INH BID@1000,1900 [facility]


   Last Admin: 09/04/18 10:25 Dose:  3 ml


Albuterol/Ipratropium (Duoneb 3.0-0.5 Mg/3 Ml)  3 ml NEB Q4H PRN


   PRN Reason: sob


Aspirin (Halfprin)  81 mg PO DAILY [facility]


   Last Admin: 09/04/18 08:33 Dose:  81 mg


Atorvastatin Calcium (Lipitor)  40 mg PO DAILY [facility]


   Last Admin: 09/04/18 08:32 Dose:  40 mg


Buspirone HCl (Buspar)  10 mg PO BID [facility]


   Last Admin: 09/04/18 08:31 Dose:  10 mg


Cholecalciferol (Vitamin D3)  4,000 units PO DAILY [facility]


   Last Admin: 09/04/18 08:31 Dose:  4,000 units


Escitalopram Oxalate (Lexapro)  10 mg PO DAILY [facility]


   Last Admin: 09/04/18 08:33 Dose:  10 mg


Gabapentin (Neurontin)  900 mg PO TID@1000,1500,1900 [facility]


   Last Admin: 09/04/18 10:24 Dose:  900 mg


Gabapentin (Neurontin)  300 mg PO TID@1000,1500,1900 [facility]


Sodium Chloride (Normal Saline)  1,000 mls @ 250 mls/hr IV ASDIRECTED [facility]


   Last Admin: 09/03/18 23:10 Dose:  250 mls/hr


Vancomycin HCl 1 gm/ Sodium (Chloride)  250 mls @ 165 mls/hr IV Q24H [facility]


   Last Admin: 09/04/18 00:21 Dose:  165 mls/hr


Vancomycin HCl 1 gm/ Sodium (Chloride)  250 mls @ 165 mls/hr IV ONETIME ONE


   Stop: 09/04/18 07:01


   Last Admin: 09/04/18 06:30 Dose:  165 mls/hr


Vancomycin HCl 1 gm/ Sodium (Chloride)  250 mls @ 165 mls/hr IV ONETIME ONE


   Stop: 09/04/18 07:45


   Last Admin: 09/04/18 06:40 Dose:  Not Given


Sodium Chloride (Normal Saline)  1,000 mls @ 100 mls/hr IV ASDIRECTED [facility]


   Last Admin: 09/04/18 10:45 Dose:  100 mls/hr


Lorazepam (Ativan)  0.5 mg PO TID@1000,1500,1900 PRN


   PRN Reason: Anxiety


Potassium Chloride (Klor-Con M20)  20 meq PO DAILY [facility]


   Last Admin: 09/04/18 08:32 Dose:  20 meq


Quetiapine Fumarate (Seroquel)  25 mg PO TID@0800,1200,1900 [facility]


   Last Admin: 09/04/18 13:53 Dose:  Not Given


Quetiapine Fumarate (Seroquel)  25 mg PO BID@0800,1900 [facility]


Torsemide (Demadex)  20 mg PO DAILY@1000 [facility]


   Last Admin: 09/04/18 10:25 Dose:  20 mg











- Exam


Quality Assessment: Supplemental Oxygen, Urine Catheter


General: Mild Distress, Lethargic (awakened to verbal stimuli)


HEENT: Mucous Membr. Moist/Pink


Neck: Trachea Midline, No JVD


Lungs: Normal Respiratory Effort, Decreased Breath Sounds, Rhonchi


Cardiovascular: Irregular Rhythm


GI/Abdominal Exam: Soft, Non-Tender, No Distention


 (Male) Exam: Deferred, Other (roth catheter)


Back Exam: Normal Inspection


Extremities: Pedal Edema


Skin: Warm, Dry, Ecchymosis


Neurological: Other (generalized weakness)


Psy/Mental Status: Other (lethargic)





- Problem List & Annotations


(1) CO2 narcosis


SNOMED Code(s): 75985586


   Code(s): R06.89 - OTHER ABNORMALITIES OF BREATHING   Status: Acute   Priority

: High   Current Visit: Yes   





(2) COPD (chronic obstructive pulmonary disease)


SNOMED Code(s): 26928691


   Code(s): J44.9 - CHRONIC OBSTRUCTIVE PULMONARY DISEASE, UNSPECIFIED   Status

: Acute   Priority: Medium   Current Visit: No   


Qualifiers: 


   COPD type: COPD with acute lower respiratory infection   Qualified Code(s): 

J44.0 - Chronic obstructive pulmonary disease with acute lower respiratory 

infection   


Annotation/Comment:: Theophylline level with next set of blood work.  Otherwise 

continue aggressive antibiotic and nebulizer therapy   





(3) Chronic congestive heart failure


SNOMED Code(s): 61707978


   Code(s): I50.9 - HEART FAILURE, UNSPECIFIED   Status: Acute   Current Visit: 

No   





(4) Hypoalbuminemia


SNOMED Code(s): 841991829


   Code(s): E88.09 - OTH DISORDERS OF PLASMA-PROTEIN METABOLISM, NEC   Status: 

Acute   Priority: Medium   Current Visit: No   Onset Date: ~05/08/17   

Annotation/Comment:: Initiate high-protein Glucerna supplements with caution 

secondary to his renal insufficiency   





(5) PE, Pulmonary embolism


SNOMED Code(s): 02259036


   Code(s): I26.99 - OTHER PULMONARY EMBOLISM WITHOUT ACUTE COR PULMONALE   

Status: Acute   Priority: High   Current Visit: No   Onset Date: 09/30/15   

Annotation/Comment:: Note previous bilateral PEs on 9/30/15 with new left 

lingual PE today by CT scan as above.  Patient is already on Coumadin therapy 

with therapeutic INR.  No further additional therapy at this time   





(6) Coronary artery disease


SNOMED Code(s): 18269031


   Code(s): I25.10 - ATHSCL HEART DISEASE OF NATIVE CORONARY ARTERY W/O ANG 

PCTRS   Status: Chronic   Priority: High   Current Visit: No   


Qualifiers: 


   Coronary Disease-Associated Artery/Lesion type: bypass graft   Native vs. 

transplanted heart: native heart   Associated angina: without angina   

Qualified Code(s): I25.810 - Atherosclerosis of coronary artery bypass graft(s) 

without angina pectoris   


Annotation/Comment:: As above   





(7) Hypertension


SNOMED Code(s): 75717392


   Code(s): I10 - ESSENTIAL (PRIMARY) HYPERTENSION   Status: Chronic   Priority

: Medium   Current Visit: No   


Qualifiers: 


   Hypertension type: essential hypertension   Qualified Code(s): I10 - 

Essential (primary) hypertension   


Annotation/Comment:: Continue to observe blood pressures closely in light of 

his Lasix therapy   





(8) IDDM (insulin dependent diabetes mellitus)


SNOMED Code(s): 56965012


   Code(s): E11.9 - TYPE 2 DIABETES MELLITUS WITHOUT COMPLICATIONS; Z79.4 - 

LONG TERM (CURRENT) USE OF INSULIN   Status: Chronic   Priority: Medium   

Current Visit: No   Annotation/Comment:: Glycosylated hemoglobin in the a.m.  

Initiate sliding scale per discretion of Dr. Gann. Note history of 

diabetic neuropathy and nephropathy with additional recurrent diabetic ulcers. 

  





(9) Osteoarthritis


SNOMED Code(s): 758439985


   Code(s): M19.90 - UNSPECIFIED OSTEOARTHRITIS, UNSPECIFIED SITE   Status: 

Chronic   Priority: Medium   Current Visit: No   


Qualifiers: 


   Osteoarthritis location: multiple joints   Osteoarthritis type: primary   

Qualified Code(s): M15.0 - Primary generalized (osteo)arthritis   


Annotation/Comment:: Stable by history.  Consider uric acid level with Lasix 

therapy   





(10) Renal insufficiency


SNOMED Code(s): 088284920, 171715166


   Code(s): N28.9 - DISORDER OF KIDNEY AND URETER, UNSPECIFIED   Status: 

Chronic   Priority: Medium   Current Visit: No   Annotation/Comment:: As above 

  





(11) CO2 retention


SNOMED Code(s): 53225972


   Code(s): E87.2 - ACIDOSIS   Status: Acute   Priority: High   Current Visit: 

Yes   





(12) Blood CO2 increased


SNOMED Code(s): 27113006


   Code(s): R79.81 - ABNORMAL BLOOD-GAS LEVEL   Status: Acute   Priority: High 

  Current Visit: Yes   





- Problem List Review


Problem List Initiated/Reviewed/Updated: Yes





- My Orders


Last 24 Hours: 


My Active Orders





09/04/18 12:24


RT Aerosol Therapy [RC] 02,06,10,14,18,22 





09/04/18 13:00


Albuterol [Proventil Neb Soln]   2.5 mg NEB Q2H PRN 





09/04/18 14:00


Albuterol/Ipratropium [DuoNeb 3.0-0.5 MG/3 ML]   3 ml INH Q4H 





09/04/18 14:21


Discontinue Telemetry Monitoring [Cardiac Monitoring Discontinue] [RC] Click to 

Edit 





09/04/18 19:00


QUEtiapine [SEROquel]   25 mg PO BID@0800,1900 





09/04/18 20:00


Sodium Chloride 0.9% [Saline Flush]   10 ml FLUSH Q12HR 





09/05/18 05:11


BLOOD GAS VENOUS [BG] Routine 


CBC WITH AUTO DIFF [HEME] DAILY 


CMP [COMPREHENSIVE METABOLIC PN,CMP] [CHEM] DAILY 


CRP [C-REACTIVE PROTEIN] [CHEM] DAILY 


GABAPENTIN, SERUM [REF] Routine 


INR,PT,PROTHROMBIN TIME [COAG] DAILY 


THEOPHYLLINE [CHEM] Routine 





09/06/18 05:11


CBC WITH AUTO DIFF [HEME] DAILY 


CMP [COMPREHENSIVE METABOLIC PN,CMP] [CHEM] DAILY 


CRP [C-REACTIVE PROTEIN] [CHEM] DAILY 


INR,PT,PROTHROMBIN TIME [COAG] DAILY 





09/07/18 05:11


CBC WITH AUTO DIFF [HEME] DAILY 


CMP [COMPREHENSIVE METABOLIC PN,CMP] [CHEM] DAILY 


CRP [C-REACTIVE PROTEIN] [CHEM] DAILY 


INR,PT,PROTHROMBIN TIME [COAG] DAILY 














- Plan


Plan:: 





09/04/18


Jorge A Guillaume MD


Respiratory failure. CO2 narcosis. He does awaken to verbal stimuli. He says he 

is not short of breath. He denies pain. Family agrees he is DNR but want 

medical treatment. Now [facility] calls and says their is a bed available.

## 2018-09-05 VITALS — DIASTOLIC BLOOD PRESSURE: 48 MMHG | SYSTOLIC BLOOD PRESSURE: 94 MMHG

## 2018-09-05 RX ADMIN — Medication PRN ML: at 01:18

## 2018-09-05 NOTE — [id number]
**Discharge Summary





- Hospital Course


Diagnosis: Stroke: No





- Discharge Data


Discharge Date: 18


Discharge Disposition:  20


Preliminary Cause of Death *Q: Respiratory Failure


Event(s) Leading to Patient's Death *Q: This gentleman had chronic COPD with 

acute exacerbation, chronic pulmonary hypertension, chronic pulmonary fibrosis 

with chronic hypoxia and chronic CO2 retention. He had developed fever, chills, 

hypoxia, O2 saturations in ~70%. He was admitted and treated with IV antibiotics

, Bi-Pap. He desired DNR/DNI status. Family agreed. He was kept comfortable and 

passed away peacefully from respiratory failure.  


Condition: Good





- Discharge Diagnosis/Problem(s)


(1) CO2 narcosis


SNOMED Code(s): 07056755


   ICD Code: R06.89 - OTHER ABNORMALITIES OF BREATHING   Status: Acute   

Priority: High   





(2) COPD (chronic obstructive pulmonary disease)


SNOMED Code(s): 08714333


   ICD Code: J44.9 - CHRONIC OBSTRUCTIVE PULMONARY DISEASE, UNSPECIFIED   Status

: Acute   Priority: Medium   Problem Details: Theophylline level with next set 

of blood work.  Otherwise continue aggressive antibiotic and nebulizer therapy 

  


Qualifiers: 


   COPD type: COPD with acute lower respiratory infection   Qualified Code(s): 

J44.0 - Chronic obstructive pulmonary disease with acute lower respiratory 

infection   





(3) Chronic congestive heart failure


SNOMED Code(s): 34340037


   ICD Code: I50.9 - HEART FAILURE, UNSPECIFIED   Status: Acute   





(4) Hypoalbuminemia


SNOMED Code(s): 973339122


   ICD Code: E88.09 - OTH DISORDERS OF PLASMA-PROTEIN METABOLISM, NEC   Status: 

Acute   Priority: Medium   Onset Date: ~17   Problem Details: Initiate 

high-protein Glucerna supplements with caution secondary to his renal 

insufficiency   





(5) PE, Pulmonary embolism


SNOMED Code(s): 49150487


   ICD Code: I26.99 - OTHER PULMONARY EMBOLISM WITHOUT ACUTE COR PULMONALE   

Status: Acute   Priority: High   Onset Date: 09/30/15   Problem Details: Note 

previous bilateral PEs on 9/30/15 with new left lingual PE today by CT scan as 

above.  Patient is already on Coumadin therapy with therapeutic INR.  No 

further additional therapy at this time   





(6) Coronary artery disease


SNOMED Code(s): 53499070


   ICD Code: I25.10 - ATHSCL HEART DISEASE OF NATIVE CORONARY ARTERY W/O ANG 

PCTRS   Status: Chronic   Priority: High   Problem Details: As above   


Qualifiers: 


   Coronary Disease-Associated Artery/Lesion type: bypass graft   Native vs. 

transplanted heart: native heart   Associated angina: without angina   

Qualified Code(s): I25.810 - Atherosclerosis of coronary artery bypass graft(s) 

without angina pectoris   





(7) Hypertension


SNOMED Code(s): 06814285


   ICD Code: I10 - ESSENTIAL (PRIMARY) HYPERTENSION   Status: Chronic   Priority

: Medium   Problem Details: Continue to observe blood pressures closely in 

light of his Lasix therapy   


Qualifiers: 


   Hypertension type: essential hypertension   Qualified Code(s): I10 - 

Essential (primary) hypertension   





(8) IDDM (insulin dependent diabetes mellitus)


SNOMED Code(s): 54745295


   ICD Code: E11.9 - TYPE 2 DIABETES MELLITUS WITHOUT COMPLICATIONS; Z79.4 - 

LONG TERM (CURRENT) USE OF INSULIN   Status: Chronic   Priority: Medium   

Problem Details: Glycosylated hemoglobin in the a.m.  Initiate sliding scale 

per discretion of Dr. Gann. Note history of diabetic neuropathy and 

nephropathy with additional recurrent diabetic ulcers.   





(9) Osteoarthritis


SNOMED Code(s): 989068976


   ICD Code: M19.90 - UNSPECIFIED OSTEOARTHRITIS, UNSPECIFIED SITE   Status: 

Chronic   Priority: Medium   Problem Details: Stable by history.  Consider uric 

acid level with Lasix therapy   


Qualifiers: 


   Osteoarthritis location: multiple joints   Osteoarthritis type: primary   

Qualified Code(s): M15.0 - Primary generalized (osteo)arthritis   





(10) Renal insufficiency


SNOMED Code(s): 622194600, 100098051


   ICD Code: N28.9 - DISORDER OF KIDNEY AND URETER, UNSPECIFIED   Status: 

Chronic   Priority: Medium   Problem Details: As above   





(11) CO2 retention


SNOMED Code(s): 09845064


   ICD Code: E87.2 - ACIDOSIS   Status: Acute   Priority: High   





(12) Blood CO2 increased


SNOMED Code(s): 59221352


   ICD Code: R79.81 - ABNORMAL BLOOD-GAS LEVEL   Status: Acute   Priority: High

   





(13) Comfort measures only status


SNOMED Code(s): 27021179837672


   ICD Code: Z51.5 - ENCOUNTER FOR PALLIATIVE CARE   Status: Acute   Priority: 

High   





- Discharge Plan


*PRESCRIPTION DRUG MONITORING PROGRAM REVIEWED*: Not Applicable


*COPY OF PRESCRIPTION DRUG MONITORING REPORT IN PATIENT DEISY: Not Applicable


Home Medications: 


 Home Meds





Acetaminophen 2 tab PO Q4HR PRN 09/30/15 [History]


Bisacodyl [Dulcolax] 10 mg RECTAL DAILY PRN 09/30/15 [History]


Gabapentin [Neurontin] 900 mg PO TID@1000,1500,1900 09/30/15 [History]


Insulin Detemir [Levemir] 38 unit SQ DAILY@1000 09/30/15 [History]


LORazepam 0.25 mg PO TID@1000,1500,1900 PRN 09/30/15 [History]


Methadone 10 mg PO BID@1000,1900 09/30/15 [History]


QUEtiapine Fumarate [Quetiapine Fumarate] 25 mg PO TID@0800,1200,1900 09/30/15 [

History]


Sennosides/Docusate Sodium [DOK Plus] 2 each PO BID@,1900 09/30/15 [History]


Torsemide 20 mg PO DAILY@1000 09/30/15 [History]


Triamcinolone Acetonide [Triamcinolone Acetonide 0.1% Crm] 15 gm TOP BID PRN 09/

30/15 [History]


Warfarin [Coumadin] 5 mg PO SUTUTHSA@1900 09/30/15 [History]


Albuterol/Ipratropium [DuoNeb 3.0-0.5 MG/3 ML] 1 vial INH BID@,1900 10/01/

15 [History]


Theophylline [Theophylline Anhydrous] 300 mg PO BID@,1900 10/01/15 [History]


Albuterol/Ipratropium [DuoNeb 3.0-0.5 MG/3 ML] 3 ml NEB Q4HRRT PRN 17 [

History]


Warfarin [Coumadin] 2.5 mg PO MOWEFR@17 [History]


Aspirin [Halfprin] 81 mg PO DAILY 18 [History]


Cholecalciferol (Vitamin D3) [Vitamin D3] 4,000 unit PO DAILY 18 [History]


Escitalopram [Lexapro] 10 mg PO DAILY 18 [History]


Hydrocortisone Acetate [Anusol-Hc] 25 mg RC BID PRN 18 [History]


Latanoprost [Xalatan] 1 drop EYEBOTH DAILY@18 [History]


Magnesium Hydroxide [Milk of Magnesia] 30 ml PO DAILY PRN 18 [History]


Melatonin 3 mg PO DAILY PRN 18 [History]


Methyl Salicylate/Menthol [Muscle Rub] 1 applic TOP BID PRN 18 [History]


Non-Formulary Medication [NF Drug] 1 spray PO BID PRN 18 [History]


Phenylephrine HCl [Sudogest PE] 10 mg PO Q4HR PRN 18 [History]


Polyethylene Glycol 3350 [MiraLAX] 17 gm PO DAILY PRN 18 [History]


Polyethylene Glycol 3350 [MiraLAX] 17 gm PO DAILY@1900 18 [History]


Sodium Chloride [Saline Nasal Spray] 1 ml NS ASDIRECTED PRN 18 [History]


atorvaSTATin [Lipitor] 40 mg PO DAILY 18 [History]


busPIRone [Buspar] 10 mg PO BID@10,19 09/03/18 [History]


guaiFENesin/Dextromethorphan [Tussin Dm Liquid] 10 ml PO Q4HR PRN 18 [

History]


oxyCODONE 5 mg PO Q6HR PRN 18 [History]


Albuterol Sulfate [Proair Respiclick] 2 puff INH Q4HR PRN 18 [History]


Fluticasone Propionate [Flovent  MCG] 2 puff INH BID@10,18 [

History]


Potassium Chloride [Klor-Con M20] 20 meq PO DAILY 18 [History]








Forms:  ED Department Discharge


Referrals: 


Sheets-Melina Guillaume MD [Primary Care Provider] - 





- Discharge Summary/Plan Comment


DC Time >30 min.: No





- Patient Data


Vitals - Most Recent: 


 Last Vital Signs











Temp  97.6 F   18 20:10


 


Pulse  94   18 01:19


 


Resp  22 H  18 01:19


 


BP  94/48 L  18 20:10


 


Pulse Ox  90 L  18 01:19











Weight - Most Recent: 343 lb 14.738 oz


I&O - Last 24 hours: 


 Intake & Output











 18





 06:59 14:59 22:59


 


Output Total 100  


 


Balance -100  











Lab Results - Last 24 hrs: 


 Laboratory Results - last 24 hr











  18 Range/Units





  17:11 19:39 


 


POC Glucose  225 H  225 H  ()  mg/dl











[facility] Results - Last 24 hrs: 


 Microbiology











 18 00:20 Urine Culture - Final





 Urine, Catheterized    Klebsiella Pneumoniae


 


 18 23:10 Aerobic Blood Culture - Preliminary





 Blood - Venous - Lab Draw    NO GROWTH AFTER 1 DAY





 Anaerobic Blood Culture - Preliminary





    NO GROWTH AFTER 1 DAY


 


 18 23:00 Aerobic Blood Culture - Preliminary





 Blood - Venous    NO GROWTH AFTER 1 DAY





 Anaerobic Blood Culture - Preliminary





    NO GROWTH AFTER 1 DAY











Med Orders - Current: 


 Current Medications








Discontinued Medications





Albuterol (Ventolin Hfa)  0 gm INH Q4H PRN


   PRN Reason: Shortness of Breath


Albuterol (Proventil Neb Soln)  2.5 mg NEB Q2H PRN


   PRN Reason: Dyspnea


   Last Admin: 18 19:44 Dose:  2.5 mg


Albuterol/Ipratropium (Duoneb 3.0-0.5 Mg/3 Ml)  3 ml INH BID@1000,1900 [facility] hospital


   Last Admin: 18 10:25 Dose:  3 ml


Albuterol/Ipratropium (Duoneb 3.0-0.5 Mg/3 Ml)  3 ml NEB Q4H PRN


   PRN Reason: sob


Albuterol/Ipratropium (Duoneb 3.0-0.5 Mg/3 Ml)  3 ml INH Q4H [facility]


   Last Admin: 18 01:19 Dose:  3 ml


Aspirin (Halfprin)  81 mg PO DAILY [facility]


   Last Admin: 18 08:33 Dose:  81 mg


Atorvastatin Calcium (Lipitor)  40 mg PO DAILY [facility]


   Last Admin: 18 08:32 Dose:  40 mg


Buspirone HCl (Buspar)  10 mg PO BID [facility]


   Last Admin: 18 08:31 Dose:  10 mg


Cholecalciferol (Vitamin D3)  4,000 units PO DAILY [facility]


   Last Admin: 18 08:31 Dose:  4,000 units


Escitalopram Oxalate (Lexapro)  10 mg PO DAILY [facility]


   Last Admin: 18 08:33 Dose:  10 mg


Furosemide (Lasix)  20 mg IVPUSH NOW ONE


   Stop: 18 20:46


   Last Admin: 18 20:49 Dose:  20 mg


Gabapentin (Neurontin)  900 mg PO TID@1000,1500,1900 [facility]


   Last Admin: 18 10:24 Dose:  900 mg


Gabapentin (Neurontin)  300 mg PO TID@1000,1500,1900 [facility]


Guaifenesin/Dextromethorphan (Robitussin Dm)  10 ml PO Q4H PRN


   PRN Reason: Cough


Hydrocortisone Acetate (Anucort-Hc)  25 mg .XX BID PRN


   PRN Reason: Hemorrhoids


Sodium Chloride (Normal Saline)  1,000 mls @ 250 mls/hr IV ASDIRECTED [facility]


   Last Admin: 18 23:10 Dose:  250 mls/hr


Piperacillin Sod/Tazobactam (Sod 3.375 gm/ Sodium Chloride)  100 mls @ 200 mls/

hr IV Q6H [facility]


   Last Admin: 18 01:18 Dose:  200 mls/hr


Vancomycin HCl 1 gm/ Sodium (Chloride)  250 mls @ 165 mls/hr IV Q24H [facility]


   Last Admin: 18 00:21 Dose:  165 mls/hr


Vancomycin HCl 1 gm/ Sodium (Chloride)  250 mls @ 165 mls/hr IV ONETIME ONE


   Stop: 18 07:01


   Last Admin: 18 06:30 Dose:  165 mls/hr


Vancomycin HCl 1 gm/ Sodium (Chloride)  250 mls @ 165 mls/hr IV ONETIME ONE


   Stop: 18 07:45


   Last Admin: 18 06:40 Dose:  Not Given


Vancomycin HCl 1.5 gm/ Sodium (Chloride)  500 mls @ 220 mls/hr IV Q24H [facility]


Sodium Chloride (Normal Saline)  1,000 mls @ 100 mls/hr IV ASDIRECTED [facility]


   Last Admin: 18 10:45 Dose:  100 mls/hr


Insulin Glargine (Lantus)  0 unit SUBCUT DAILY@1000 [facility]


   Last Admin: 18 10:25 Dose:  Not Given


Insulin Glargine (Lantus)  25 unit SUBCUT BEDTIME [facility]


   Last Admin: 18 22:11 Dose:  25 units


Latanoprost (Xalatan 0.005% Ophth Soln)  0 ml EYEBOTH DAILY [facility]


   Last Admin: 18 08:34 Dose:  1 drop


Lorazepam (Ativan)  0.5 mg PO TID@1000,1500,1900 PRN


   PRN Reason: Anxiety


Lorazepam (Ativan)  0.25 mg PO TID@1000,1500,1900 PRN


   PRN Reason: Anxiety


   Last Admin: 18 01:33 Dose:  0.25 mg


Magnesium Hydroxide (Milk Of Magnesia)  30 ml PO DAILY PRN


   PRN Reason: Constipation


Melatonin (Melatonin)  3 mg PO DAILY PRN


   PRN Reason: Insomnia


Methadone HCl (Methadone)  10 mg PO BID@1000,1900 [facility]


   Last Admin: 18 19:41 Dose:  10 mg


Oxycodone HCl (Oxycodone)  5 mg PO Q6HR PRN


   PRN Reason: Pain


Potassium Chloride (Klor-Con M20)  20 meq PO DAILY [facility]


   Last Admin: 18 08:32 Dose:  20 meq


Potassium Chloride (Potassium Chloride Solution)  20 meq PO ONETIME ONE


   Stop: 18 20:30


   Last Admin: 18 20:49 Dose:  20 meq


Quetiapine Fumarate (Seroquel)  25 mg PO TID@0800,1200,1900 [facility]


   Last Admin: 18 13:53 Dose:  Not Given


Quetiapine Fumarate (Seroquel)  25 mg PO BID@0800,1900 [facility]


Sodium Chloride (Saline Flush)  10 ml FLUSH ASDIRECTED PRN


   PRN Reason: Keep Vein Open


   Last Admin: 18 01:18 Dose:  10 ml


Sodium Chloride ([place] Nasal Spray)  0 ml JOVITA ASDIRECTED PRN


   PRN Reason: Dryness


Sodium Chloride (Saline Flush)  10 ml FLUSH Q12HR [facility]


   Last Admin: 18 19:47 Dose:  10 ml


Theophylline (Theophylline Anhydrous)  300 mg PO BID@1000,1900 [facility]


   Last Admin: 18 19:41 Dose:  300 mg


Torsemide (Demadex)  20 mg PO DAILY@1000 [facility]


   Last Admin: 18 10:25 Dose:  20 mg


Triamcinolone Acetonide (Triamcinolone Acetonide 0.1% Crm)  15 gm TOP BID PRN


   PRN Reason: Itching


Warfarin Sodium (Coumadin)  2.5 mg PO MoWeFr@1800 [facility]


Warfarin Sodium (Coumadin)  5 mg PO SuTuThSa@1800 [facility]


   Last Admin: 18 17:58 Dose:  5 mg